# Patient Record
Sex: FEMALE | Race: WHITE | Employment: FULL TIME | URBAN - METROPOLITAN AREA
[De-identification: names, ages, dates, MRNs, and addresses within clinical notes are randomized per-mention and may not be internally consistent; named-entity substitution may affect disease eponyms.]

---

## 2018-03-18 PROCEDURE — 99282 EMERGENCY DEPT VISIT SF MDM: CPT

## 2018-03-19 ENCOUNTER — HOSPITAL ENCOUNTER (EMERGENCY)
Age: 28
Discharge: HOME | End: 2018-03-19
Attending: EMERGENCY MEDICINE
Payer: SELF-PAY

## 2018-03-19 VITALS
TEMPERATURE: 98.2 F | SYSTOLIC BLOOD PRESSURE: 165 MMHG | WEIGHT: 190 LBS | HEART RATE: 91 BPM | BODY MASS INDEX: 31.65 KG/M2 | DIASTOLIC BLOOD PRESSURE: 89 MMHG | OXYGEN SATURATION: 100 % | HEIGHT: 65 IN | RESPIRATION RATE: 20 BRPM

## 2018-03-19 DIAGNOSIS — H66.91 RIGHT ACUTE OTITIS MEDIA: Primary | ICD-10-CM

## 2018-03-19 RX ORDER — IBUPROFEN 800 MG/1
800 TABLET ORAL EVERY 8 HOURS
Qty: 15 TAB | Refills: 0 | Status: SHIPPED | OUTPATIENT
Start: 2018-03-19 | End: 2018-03-24

## 2018-03-19 RX ORDER — AZITHROMYCIN 250 MG/1
TABLET, FILM COATED ORAL
Qty: 6 TAB | Refills: 0 | Status: SHIPPED | OUTPATIENT
Start: 2018-03-19 | End: 2018-03-24

## 2018-03-19 RX ORDER — LIDOCAINE HYDROCHLORIDE 20 MG/ML
15 SOLUTION OROPHARYNGEAL AS NEEDED
Qty: 1 BOTTLE | Refills: 0 | Status: SHIPPED | OUTPATIENT
Start: 2018-03-19 | End: 2018-09-01

## 2018-03-19 NOTE — LETTER
[facility] [place] 
THE [facility] EMERGENCY DEPT 
[address] [address] 
[phone] Work/School Note Date: 3/18/2018 To Whom It May concern: 
 
Dilip Key was seen and treated today in the emergency room by the following provider(s): 
Attending Provider: Gabriel Quesada MD.   
 
Dilip Kye may return to work on 3/21/18.  
 
Sincerely, 
 
 
 
 
 
 
 
Tab Goodwin MD

## 2018-03-19 NOTE — ED TRIAGE NOTES
Patient with complaints of a sore throat since Friday night. Patient's mother told her that it looked like a scratch.

## 2018-03-19 NOTE — ED PROVIDER NOTES
EMERGENCY DEPARTMENT HISTORY AND PHYSICAL EXAM    Date: 3/19/2018  Patient Name: Magdalena Trevizo    History of Presenting Illness     Chief Complaint   Patient presents with    Sore Throat         History Provided By: Patient    Chief Complaint: Sore throat  Duration: 3 days   Timing:  Worsening  Location: Throat  Quality: Aching  Severity: 8 out of 10  Modifying Factors: Notes no relief with Tylenol, last dose 7.5 hours ago. Exacerbated with talking, laughing, and breathing. Associated Symptoms: right ear pain    Additional History (Context):   12:35 AM  Magdalena Trevizo is a 24 y.o. female with PMHx asthma who presents ambulatory to the emergency department C/O gradually worsening sore throat (rated 8/10), onset 3 days ago. Notes no relief with Tylenol, last dose 7.5 hours ago. Exacerbated with talking, laughing, and breathing. Associated sxs include right ear pain. Pt's mother told her it \"looked like a cut. \" Endorses social EtOH use. Pt denies fever, chills, daily medication use, tobacco/illicit drug use, or any other sxs or complaints. PCP: Saroj Ibarra MD        Past History     Past Medical History:  Past Medical History:   Diagnosis Date    Anxiety     Asthma     Heart murmur     Panic attacks        Past Surgical History:  History reviewed. No pertinent surgical history. Family History:  History reviewed. No pertinent family history. Social History:  Social History   Substance Use Topics    Smoking status: Never Smoker    Smokeless tobacco: Never Used    Alcohol use No       Allergies: Allergies   Allergen Reactions    Aspirin Swelling    Pcn [Penicillins] Rash         Review of Systems   Review of Systems   Constitutional: Negative for chills and fever. HENT: Positive for ear pain (right) and sore throat. All other systems reviewed and are negative.       Physical Exam     Vitals:    03/19/18 0031   BP: 165/89   Pulse: 91   Resp: 20   Temp: 98.2 °F (36.8 °C)   SpO2: 100%   Weight: 86.2 kg (190 lb)   Height: 5' 5\" (1.651 m)     Physical Exam   Nursing note and vitals reviewed. Constitutional: Well appearing, no acute distress  Head: Normocephalic, Atraumatic;   Mouth: Erythematous posterior oropharynx;   Ears: Right TM erythematous and cloudy  Eyes: Pupils are equal, round, and reactive to light, EOMI  Neck: Supple  Chest: Normal work of breathing and chest excursion bilaterally  Back: No evidence of trauma or deformity  Extremities: No evidence of trauma or deformity, no LE edema  Skin: Warm and dry, normal cap refill  Neuro: Alert and appropriate, CN intact, normal speech  Psychiatric: Normal mood and affect    Diagnostic Study Results     Labs -   No results found for this or any previous visit (from the past 12 hour(s)). Radiologic Studies -    No orders to display     CT Results  (Last 48 hours)    None        CXR Results  (Last 48 hours)    None            Medical Decision Making   I am the first provider for this patient. I reviewed the vital signs, available nursing notes, past medical history, past surgical history, family history and social history. Vital Signs-Reviewed the patient's vital signs. Pulse Oximetry Analysis - 100% on RA     Records Reviewed: Nursing Notes    Provider Notes (Medical Decision Making):     Procedures:  Procedures    MEDICATIONS GIVEN:  Medications - No data to display    ED Course:   12:35 AM   Initial assessment performed. The patients presenting problems have been discussed, and they are in agreement with the care plan formulated and outlined with them. I have encouraged them to ask questions as they arise throughout their visit. Diagnosis and Disposition     Discussion:  24 y.o. female presents with hx and exam consistent with otitis and pharyngitis. Plan for abx, symptom management, and discharge with PCP follow up and return precautions provided. Pt understands and agrees with this plan.     DISCHARGE NOTE:  12:45 AM  Sukhdeep Martinez results have been reviewed with her. She has been counseled regarding her diagnosis, treatment, and plan. She verbally conveys understanding and agreement of the signs, symptoms, diagnosis, treatment and prognosis and additionally agrees to follow up as discussed. She also agrees with the care-plan and conveys that all of her questions have been answered. I have also provided discharge instructions for her that include: educational information regarding their diagnosis and treatment, and list of reasons why they would want to return to the ED prior to their follow-up appointment, should her condition change. She has been provided with education for proper emergency department utilization. CLINICAL IMPRESSION:    1. Right acute otitis media        PLAN:  1. D/C Home  2. Discharge Medication List as of 3/19/2018 12:52 AM      START taking these medications    Details   lidocaine (LIDOCAINE VISCOUS) 2 % solution Take 15 mL by mouth as needed for Pain., Print, Disp-1 Bottle, R-0      ibuprofen (MOTRIN) 800 mg tablet Take 1 Tab by mouth every eight (8) hours for 5 days. , Print, Disp-15 Tab, R-0      azithromycin (ZITHROMAX Z-AISHA) 250 mg tablet Take 2 Tabs PO day 1 and 1 Tab PO days 2-5, Print, Disp-6 Tab, R-0           3. Follow-up Information     Follow up With Details Comments MD Varinder Schedule an appointment as soon as possible for a visit in 2 days for PCP follow up [address] [place] [address],[address]      [facility] EMERGENCY DEPT  As needed, If symptoms worsen [address] [place] [address]  [phone]        _______________________________    Attestations: This note is prepared by Chase Do, acting as Scribe for Tanya Palmer MD.    Tanya Palmer MD:  The scribe's documentation has been prepared under my direction and personally reviewed by me in its entirety.   I confirm that the note above accurately reflects all work, treatment, procedures, and medical decision making performed by me.  _______________________________

## 2018-03-19 NOTE — DISCHARGE INSTRUCTIONS
Ear Infection (Otitis Media): Care Instructions  Your Care Instructions    An ear infection may start with a cold and affect the middle ear (otitis media). It can hurt a lot. Most ear infections clear up on their own in a couple of days. Most often you will not need antibiotics. This is because many ear infections are caused by a virus. Antibiotics don't work against a virus. Regular doses of pain medicines are the best way to reduce your fever and help you feel better. Follow-up care is a key part of your treatment and safety. Be sure to make and go to all appointments, and call your doctor if you are having problems. It's also a good idea to know your test results and keep a list of the medicines you take. How can you care for yourself at home? · Take pain medicines exactly as directed. ¨ If the doctor gave you a prescription medicine for pain, take it as prescribed. ¨ If you are not taking a prescription pain medicine, take an over-the-counter medicine, such as acetaminophen (Tylenol), ibuprofen (Advil, Motrin), or naproxen (Aleve). Read and follow all instructions on the label. ¨ Do not take two or more pain medicines at the same time unless the doctor told you to. Many pain medicines have acetaminophen, which is Tylenol. Too much acetaminophen (Tylenol) can be harmful. · Plan to take a full dose of pain reliever before bedtime. Getting enough sleep will help you get better. · Try a warm, moist washcloth on the ear. It may help relieve pain. · If your doctor prescribed antibiotics, take them as directed. Do not stop taking them just because you feel better. You need to take the full course of antibiotics. When should you call for help? Call your doctor now or seek immediate medical care if:  ? · You have new or increasing ear pain. ? · You have new or increasing pus or blood draining from your ear. ? · You have a fever with a stiff neck or a severe headache. ? Watch closely for changes in your health, and be sure to contact your doctor if:  ? · You have new or worse symptoms. ? · You are not getting better after taking an antibiotic for 2 days. Where can you learn more? Go to http://sammy-pravin.info/. Enter A184 in the search box to learn more about \"Ear Infection (Otitis Media): Care Instructions. \"  Current as of: May 12, 2017  Content Version: 11.4  © 4747-1906 [employer], [employer]. Care instructions adapted under license by [employer] (which disclaims liability or warranty for this information). If you have questions about a medical condition or this instruction, always ask your healthcare professional. [address] any warranty or liability for your use of this information.

## 2018-09-01 ENCOUNTER — HOSPITAL ENCOUNTER (EMERGENCY)
Age: 28
Discharge: HOME | End: 2018-09-02
Attending: EMERGENCY MEDICINE
Payer: COMMERCIAL

## 2018-09-01 DIAGNOSIS — Z23 NEED FOR TETANUS BOOSTER: ICD-10-CM

## 2018-09-01 DIAGNOSIS — S51.011A ELBOW LACERATION, RIGHT, INITIAL ENCOUNTER: Primary | ICD-10-CM

## 2018-09-01 PROCEDURE — 99283 EMERGENCY DEPT VISIT LOW MDM: CPT

## 2018-09-01 PROCEDURE — 75810000293 HC SIMP/SUPERF WND  RPR

## 2018-09-02 VITALS
WEIGHT: 195 LBS | BODY MASS INDEX: 32.49 KG/M2 | SYSTOLIC BLOOD PRESSURE: 128 MMHG | DIASTOLIC BLOOD PRESSURE: 77 MMHG | TEMPERATURE: 98.3 F | HEART RATE: 92 BPM | OXYGEN SATURATION: 99 % | HEIGHT: 65 IN | RESPIRATION RATE: 16 BRPM

## 2018-09-02 PROCEDURE — 75810000293 HC SIMP/SUPERF WND  RPR

## 2018-09-02 PROCEDURE — 74011250636 HC RX REV CODE- 250/636: Performed by: PHYSICIAN ASSISTANT

## 2018-09-02 PROCEDURE — 74011000250 HC RX REV CODE- 250: Performed by: PHYSICIAN ASSISTANT

## 2018-09-02 PROCEDURE — 90715 TDAP VACCINE 7 YRS/> IM: CPT | Performed by: PHYSICIAN ASSISTANT

## 2018-09-02 PROCEDURE — 77030002986 HC SUT PROL J&J -A

## 2018-09-02 PROCEDURE — 90471 IMMUNIZATION ADMIN: CPT

## 2018-09-02 PROCEDURE — 77030018836 HC SOL IRR NACL ICUM -A

## 2018-09-02 RX ADMIN — TETANUS TOXOID, REDUCED DIPHTHERIA TOXOID AND ACELLULAR PERTUSSIS VACCINE, ADSORBED 0.5 ML: 5; 2.5; 8; 8; 2.5 SUSPENSION INTRAMUSCULAR at 00:39

## 2018-09-02 RX ADMIN — Medication 2 ML: at 00:39

## 2018-09-02 NOTE — ED PROVIDER NOTES
EMERGENCY DEPARTMENT HISTORY AND PHYSICAL EXAM 
 
Date: 9/1/2018 Patient Name: Francis Cao History of Presenting Illness Chief Complaint Patient presents with  Laceration History Provided By: Patient Chief Complaint: wound, laceration Duration: 2.5 Hours Timing:  Acute Location: right arm Severity: 8 out of 10 Associated Symptoms: denies any other associated signs or symptoms Additional History (Context):  
 
12:31 AM 
 
Francis Cao is a 34 y.o. female with pertinent PMHx of anxiety presenting ambulatory to the ED c/o laceration with 8/10 pain to her right arm s/p injury at 2200 today. Pt states that she was in the bathroom at [facility] when she cut herself on the bathroom handle. Pt states that her tetanus is not UTD. Pt denies any history of DM. Pt specifically denies any other wounds, fever/chills, or N/V/D. 
 
PCP: Naomi Osman MD 
Pt does not smoke tobacco, drink ETOH or do illicit drugs. There are no other complaints, changes, or physical findings at this time. Past History Past Medical History: 
Past Medical History:  
Diagnosis Date  Anxiety  Asthma  DJD (degenerative joint disease)  Heart murmur  Panic attacks Past Surgical History: 
History reviewed. No pertinent surgical history. Family History: 
History reviewed. No pertinent family history. Social History: 
Social History Substance Use Topics  Smoking status: Never Smoker  Smokeless tobacco: Never Used  Alcohol use No  
 
 
Allergies: Allergies Allergen Reactions  Aspirin Swelling  Pcn [Penicillins] Rash Review of Systems Review of Systems Constitutional: Negative for chills and fever. Gastrointestinal: Negative for diarrhea, nausea and vomiting. Skin: Positive for wound (right arm). Neurological: Negative for headaches. All other systems reviewed and are negative. Physical Exam  
 
Vitals:  
 09/01/18 2334 09/02/18 0200 BP: 132/72 128/77 Pulse: (!) 103 92 Resp: 18 16 Temp: 98.3 °F (36.8 °C) SpO2: 99% 99% Weight: 88.5 kg (195 lb) Height: 5' 5\" (1.651 m) Physical Exam  
Constitutional: She is oriented to person, place, and time. She appears well-developed and well-nourished. HENT:  
Head: Normocephalic and atraumatic. Cardiovascular: Normal rate, regular rhythm, normal heart sounds and intact distal pulses. No murmur heard. Pulmonary/Chest: Effort normal and breath sounds normal. No respiratory distress. She has no wheezes. She has no rales. Musculoskeletal:  
     Right elbow: Normal.No tenderness found. Neurological: She is alert and oriented to person, place, and time. Skin:  
 
  
Psychiatric: She has a normal mood and affect. Judgment normal.  
Nursing note and vitals reviewed. Diagnostic Study Results Labs - No results found for this or any previous visit (from the past 12 hour(s)). Radiologic Studies - No orders to display Medical Decision Making I am the first provider for this patient. I reviewed the vital signs, available nursing notes, past medical history, past surgical history, family history and social history. Vital Signs-Reviewed the patient's vital signs. Pulse Oximetry Analysis - 99% on RA Records Reviewed: Nursing Notes and Old Medical Records Provider Notes (Medical Decision Making): Laceration in need of repair and tetanus updated. No bony injury, no FB  
 
PROCEDURES: 
Wound Repair 
Date/Time: 9/2/2018 1:41 AM 
Performed by: PAPreparation: skin prepped with Shur-Clens Pre-procedure re-eval: Immediately prior to the procedure, the patient was reevaluated and found suitable for the planned procedure and any planned medications. Location details: right arm Wound length:2.5 cm or less Anesthesia: local infiltration Anesthesia: 
Local Anesthetic: lidocaine 1% without epinephrine Anesthetic total: 3 mL Foreign bodies: no foreign bodies Wound fascia closure material used: 5-0 prolyene. Number of sutures: 2 Technique: simple and interrupted Approximation: close Dressing: 4x4 Patient tolerance: Patient tolerated the procedure well with no immediate complications My total time at bedside, performing this procedure was 16-30 minutes. Comments: LET was also used prior to procedure MEDICATIONS GIVEN IN THE ED: 
Medications diph,Pertuss(AC),Tet Vac-PF (BOOSTRIX) suspension 0.5 mL (0.5 mL IntraMUSCular Given 9/2/18 0039)  
lidocaine/EPINEPHrine/tetracaine (LET) topical solution 2 mL (2 mL Topical Given 9/2/18 0039) ED COURSE:  
12:31 AM  
Initial assessment performed. Diagnosis and Disposition DISCHARGE NOTE: 
2:00 AM 
The patient is ready for discharge. The patient's signs, symptoms, diagnosis, and discharge instructions have been discussed and the patient and/or family has conveyed their understanding. The patient and/or family is to follow up as recommended or return to the ER should their symptoms worsen. Plan has been discussed and the patient and/or family is in agreement. Written by Crow Ochoa, ED Scribe, as dictated by Kelsea Ibrahim PA-C.  
 
CLINICAL IMPRESSION: 
1. Elbow laceration, right, initial encounter 2. Need for tetanus booster PLAN: 
1. D/C Home 2. There are no discharge medications for this patient. 3.  
Follow-up Information Follow up With Details Comments Contact Info Paulette Rubio MD Schedule an appointment as soon as possible for a visit As needed [place] [address] [place] [address] 
[phone] [facility] EMERGENCY DEPT  As needed, If symptoms worsen [address] [place] [address] 
[phone]  
  
 
_______________________________ Attestations: This note is prepared by Nette Evans, acting as Scribe for Kelsea Ibrahim PA-C.  
 
Kelsea Ibrahim PA-C:  The scribe's documentation has been prepared under my direction and personally reviewed by me in its entirety. I confirm that the note above accurately reflects all work, treatment, procedures, and medical decision making performed by me. 
_______________________________

## 2018-09-02 NOTE — ED NOTES
Pt give discharge instructions on wound care and to get the stiches out in 2 weeks. However, pt left before discharge paperwork was given. Pt ambulatory to lobby with friends.

## 2018-10-04 ENCOUNTER — HOSPITAL ENCOUNTER (EMERGENCY)
Age: 28
Discharge: HOME | End: 2018-10-04
Attending: EMERGENCY MEDICINE
Payer: COMMERCIAL

## 2018-10-04 ENCOUNTER — APPOINTMENT (OUTPATIENT)
Dept: GENERAL RADIOLOGY | Age: 28
End: 2018-10-04
Attending: EMERGENCY MEDICINE
Payer: COMMERCIAL

## 2018-10-04 VITALS
OXYGEN SATURATION: 99 % | HEIGHT: 65 IN | DIASTOLIC BLOOD PRESSURE: 72 MMHG | RESPIRATION RATE: 14 BRPM | HEART RATE: 80 BPM | BODY MASS INDEX: 32.49 KG/M2 | TEMPERATURE: 97.8 F | WEIGHT: 195 LBS | SYSTOLIC BLOOD PRESSURE: 150 MMHG

## 2018-10-04 DIAGNOSIS — E11.9 NEW ONSET TYPE 2 DIABETES MELLITUS (HCC): Primary | ICD-10-CM

## 2018-10-04 LAB
ALBUMIN SERPL-MCNC: 3.6 G/DL (ref 3.4–5)
ALBUMIN/GLOB SERPL: 0.9 {RATIO} (ref 0.8–1.7)
ALP SERPL-CCNC: 65 U/L (ref 45–117)
ALT SERPL-CCNC: 51 U/L (ref 13–56)
ANION GAP SERPL CALC-SCNC: 7 MMOL/L (ref 3–18)
APPEARANCE UR: CLEAR
AST SERPL-CCNC: 18 U/L (ref 15–37)
BACTERIA URNS QL MICRO: ABNORMAL /HPF
BASOPHILS # BLD: 0.1 K/UL (ref 0–0.1)
BASOPHILS NFR BLD: 1 % (ref 0–2)
BILIRUB SERPL-MCNC: 0.2 MG/DL (ref 0.2–1)
BILIRUB UR QL: NEGATIVE
BUN SERPL-MCNC: 8 MG/DL (ref 7–18)
BUN/CREAT SERPL: 10 (ref 12–20)
CALCIUM SERPL-MCNC: 9.3 MG/DL (ref 8.5–10.1)
CHLORIDE SERPL-SCNC: 101 MMOL/L (ref 100–108)
CO2 SERPL-SCNC: 27 MMOL/L (ref 21–32)
COLOR UR: YELLOW
CREAT SERPL-MCNC: 0.83 MG/DL (ref 0.6–1.3)
DIFFERENTIAL METHOD BLD: NORMAL
EOSINOPHIL # BLD: 0.4 K/UL (ref 0–0.4)
EOSINOPHIL NFR BLD: 5 % (ref 0–5)
EPITH CASTS URNS QL MICRO: ABNORMAL /LPF (ref 0–5)
ERYTHROCYTE [DISTWIDTH] IN BLOOD BY AUTOMATED COUNT: 13.1 % (ref 11.6–14.5)
GLOBULIN SER CALC-MCNC: 4.1 G/DL (ref 2–4)
GLUCOSE SERPL-MCNC: 220 MG/DL (ref 74–99)
GLUCOSE UR STRIP.AUTO-MCNC: >1000 MG/DL
HCG UR QL: NEGATIVE
HCT VFR BLD AUTO: 38.2 % (ref 35–45)
HGB BLD-MCNC: 12.5 G/DL (ref 12–16)
HGB UR QL STRIP: ABNORMAL
KETONES UR QL STRIP.AUTO: ABNORMAL MG/DL
LEUKOCYTE ESTERASE UR QL STRIP.AUTO: NEGATIVE
LIPASE SERPL-CCNC: 121 U/L (ref 73–393)
LYMPHOCYTES # BLD: 2.9 K/UL (ref 0.9–3.6)
LYMPHOCYTES NFR BLD: 39 % (ref 21–52)
MAGNESIUM SERPL-MCNC: 1.8 MG/DL (ref 1.6–2.6)
MCH RBC QN AUTO: 27.2 PG (ref 24–34)
MCHC RBC AUTO-ENTMCNC: 32.7 G/DL (ref 31–37)
MCV RBC AUTO: 83.2 FL (ref 74–97)
MONOCYTES # BLD: 0.7 K/UL (ref 0.05–1.2)
MONOCYTES NFR BLD: 10 % (ref 3–10)
MUCOUS THREADS URNS QL MICRO: ABNORMAL /LPF
NEUTS SEG # BLD: 3.4 K/UL (ref 1.8–8)
NEUTS SEG NFR BLD: 45 % (ref 40–73)
NITRITE UR QL STRIP.AUTO: NEGATIVE
PH UR STRIP: 5 [PH] (ref 5–8)
PHOSPHATE SERPL-MCNC: 4.4 MG/DL (ref 2.5–4.9)
PLATELET # BLD AUTO: 333 K/UL (ref 135–420)
PMV BLD AUTO: 9.7 FL (ref 9.2–11.8)
POTASSIUM SERPL-SCNC: 4 MMOL/L (ref 3.5–5.5)
PROT SERPL-MCNC: 7.7 G/DL (ref 6.4–8.2)
PROT UR STRIP-MCNC: NEGATIVE MG/DL
RBC # BLD AUTO: 4.59 M/UL (ref 4.2–5.3)
RBC #/AREA URNS HPF: ABNORMAL /HPF (ref 0–5)
SODIUM SERPL-SCNC: 135 MMOL/L (ref 136–145)
SP GR UR REFRACTOMETRY: >1.03 (ref 1–1.03)
UROBILINOGEN UR QL STRIP.AUTO: 1 EU/DL (ref 0.2–1)
WBC # BLD AUTO: 7.5 K/UL (ref 4.6–13.2)
WBC URNS QL MICRO: ABNORMAL /HPF (ref 0–5)

## 2018-10-04 PROCEDURE — 74022 RADEX COMPL AQT ABD SERIES: CPT

## 2018-10-04 PROCEDURE — 80053 COMPREHEN METABOLIC PANEL: CPT | Performed by: EMERGENCY MEDICINE

## 2018-10-04 PROCEDURE — 84100 ASSAY OF PHOSPHORUS: CPT | Performed by: EMERGENCY MEDICINE

## 2018-10-04 PROCEDURE — 81025 URINE PREGNANCY TEST: CPT

## 2018-10-04 PROCEDURE — 85025 COMPLETE CBC W/AUTO DIFF WBC: CPT | Performed by: EMERGENCY MEDICINE

## 2018-10-04 PROCEDURE — 83690 ASSAY OF LIPASE: CPT | Performed by: EMERGENCY MEDICINE

## 2018-10-04 PROCEDURE — 74011250637 HC RX REV CODE- 250/637: Performed by: EMERGENCY MEDICINE

## 2018-10-04 PROCEDURE — 81001 URINALYSIS AUTO W/SCOPE: CPT | Performed by: EMERGENCY MEDICINE

## 2018-10-04 PROCEDURE — 74011250636 HC RX REV CODE- 250/636: Performed by: EMERGENCY MEDICINE

## 2018-10-04 PROCEDURE — 99284 EMERGENCY DEPT VISIT MOD MDM: CPT

## 2018-10-04 PROCEDURE — 83735 ASSAY OF MAGNESIUM: CPT | Performed by: EMERGENCY MEDICINE

## 2018-10-04 PROCEDURE — 96360 HYDRATION IV INFUSION INIT: CPT

## 2018-10-04 RX ORDER — POLYETHYLENE GLYCOL 3350 17 G/17G
17 POWDER, FOR SOLUTION ORAL DAILY
Qty: 510 G | Refills: 1 | Status: ON HOLD | OUTPATIENT
Start: 2018-10-04 | End: 2020-10-20

## 2018-10-04 RX ORDER — SODIUM CHLORIDE 0.9 % (FLUSH) 0.9 %
5-10 SYRINGE (ML) INJECTION EVERY 8 HOURS
Status: DISCONTINUED | OUTPATIENT
Start: 2018-10-04 | End: 2018-10-04 | Stop reason: HOSPADM

## 2018-10-04 RX ORDER — METFORMIN HYDROCHLORIDE 500 MG/1
500 TABLET ORAL
Status: COMPLETED | OUTPATIENT
Start: 2018-10-04 | End: 2018-10-04

## 2018-10-04 RX ORDER — METFORMIN HYDROCHLORIDE 500 MG/1
500 TABLET ORAL 2 TIMES DAILY WITH MEALS
Qty: 30 TAB | Refills: 0 | Status: ON HOLD | OUTPATIENT
Start: 2018-10-04 | End: 2020-10-20

## 2018-10-04 RX ORDER — SODIUM CHLORIDE 0.9 % (FLUSH) 0.9 %
5-10 SYRINGE (ML) INJECTION AS NEEDED
Status: DISCONTINUED | OUTPATIENT
Start: 2018-10-04 | End: 2018-10-04 | Stop reason: HOSPADM

## 2018-10-04 RX ORDER — DICYCLOMINE HYDROCHLORIDE 20 MG/1
20 TABLET ORAL EVERY 6 HOURS
Qty: 20 TAB | Refills: 0 | Status: ON HOLD | OUTPATIENT
Start: 2018-10-04 | End: 2020-10-20

## 2018-10-04 RX ADMIN — METFORMIN HYDROCHLORIDE 500 MG: 500 TABLET, FILM COATED ORAL at 08:13

## 2018-10-04 RX ADMIN — SODIUM CHLORIDE 1000 ML: 900 INJECTION, SOLUTION INTRAVENOUS at 05:53

## 2018-10-04 RX ADMIN — Medication 10 ML: at 05:54

## 2018-10-04 NOTE — ED NOTES
Pt lying on the stretcher smiling. NS bolus continues to infuse. VSS. No needs/or complaints being voiced by pt. NAD observed.

## 2018-10-04 NOTE — LETTER
NOTIFICATION RETURN TO WORK / SCHOOL 
 
10/4/2018 7:41 AM 
 
Ms. Alma Tanner [address] [place] [id number] To Whom It May Concern: 
 
Alma Manzo is currently under the care of [facility] EMERGENCY DEPT. She will return to work/school on: October 5th, 2018 If there are questions or concerns please have the patient contact our office.  
 
 
 
Sincerely, 
 
 
 
 
Tracy Ramírez MD

## 2018-10-04 NOTE — ED PROVIDER NOTES
EMERGENCY DEPARTMENT HISTORY AND PHYSICAL EXAM 
 
Date: 10/4/2018 Patient Name: Lele De Jesus History of Presenting Illness Chief Complaint Patient presents with  Abdominal Pain History Provided By: Patient Chief Complaint: Abd pain Duration: 1 Months Timing:  Intermittent and Worsening Location: LUQ Severity: 4 out of 10 Modifying Factors: eating food worsens the pain Associated Symptoms: nausea and vomiting Additional History (Context):  
5:28 AM 
Lele De Jesus is a 34 y.o. female with PMHx of anxiety, panic attacks, anxiety, DJD and FHx of borderline DM presents to the emergency department C/O worsening intermittent abd pain onset 1 month. Associated sxs include polydipsia, polyuria, nausea, and vomiting. Pt reports over the past month the patient has been experiencing abd more frequently occurring about 30-45 minute episodes. Pt reports that her abd pain worsens after eating. At present the patient is not in any pain. Pt has occasionally irregular MP, never been pregnant. Pt does not endorse any tobacco use, occasional EtOH use. Pt denies diarrhea, constipation, and any other sxs or complaints. PCP: Angela Saez MD 
 
 
 
Past History Past Medical History: 
Past Medical History:  
Diagnosis Date  Anxiety  Asthma  DJD (degenerative joint disease)  Heart murmur  Panic attacks Past Surgical History: 
History reviewed. No pertinent surgical history. Family History: 
History reviewed. No pertinent family history. Social History: 
Social History Substance Use Topics  Smoking status: Never Smoker  Smokeless tobacco: Never Used  Alcohol use No  
 
 
Allergies: Allergies Allergen Reactions  Aspirin Swelling  Pcn [Penicillins] Rash Review of Systems Review of Systems Gastrointestinal: Positive for abdominal pain, nausea and vomiting. Negative for diarrhea. Endocrine: Positive for polydipsia and polyuria. All other systems reviewed and are negative. Physical Exam  
 
Vitals:  
 10/04/18 1882 10/04/18 4925 10/04/18 4472 BP: 140/82  152/74 Pulse: 81  84 Resp: 16  12 Temp: 97.5 °F (36.4 °C)  97 °F (36.1 °C) SpO2: 98% 98% 99% Weight: 88.5 kg (195 lb) Height: 5' 5\" (1.651 m) Physical Exam  
Constitutional: She is oriented to person, place, and time. She appears well-developed and well-nourished. No distress. Obese uncomfortable appearing nontoxic HENT:  
Head: Normocephalic and atraumatic. Right Ear: External ear normal.  
Left Ear: External ear normal.  
Mouth/Throat: Oropharynx is clear and moist. No oropharyngeal exudate. Eyes: Conjunctivae and EOM are normal. Pupils are equal, round, and reactive to light. No scleral icterus. No pallor Neck: Normal range of motion. Neck supple. No JVD present. No tracheal deviation present. No thyromegaly present. Cardiovascular: Normal rate, regular rhythm and normal heart sounds. Pulmonary/Chest: Effort normal and breath sounds normal. No stridor. No respiratory distress. Abdominal: Soft. Bowel sounds are normal. She exhibits no distension. There is no hepatosplenomegaly. There is no tenderness. There is no rebound, no guarding and negative Barnard's sign. Musculoskeletal: Normal range of motion. She exhibits no edema or tenderness. No soft tissue injuries Lymphadenopathy:  
  She has no cervical adenopathy. Neurological: She is alert and oriented to person, place, and time. She has normal reflexes. No cranial nerve deficit. Coordination normal.  
Skin: Skin is warm and dry. No rash noted. She is not diaphoretic. No erythema. Psychiatric: She has a normal mood and affect. Her behavior is normal. Judgment and thought content normal.  
Nursing note and vitals reviewed. Diagnostic Study Results Labs - Recent Results (from the past 12 hour(s)) URINALYSIS W/ RFLX MICROSCOPIC  Collection Time: 10/04/18  5:18 AM  
 Result Value Ref Range Color YELLOW Appearance CLEAR Specific gravity >1.030 (H) 1.005 - 1.030  
 pH (UA) 5.0 5.0 - 8.0 Protein NEGATIVE  NEG mg/dL Glucose >1000 (A) NEG mg/dL Ketone TRACE (A) NEG mg/dL Bilirubin NEGATIVE  NEG Blood SMALL (A) NEG Urobilinogen 1.0 0.2 - 1.0 EU/dL Nitrites NEGATIVE  NEG Leukocyte Esterase NEGATIVE  NEG    
URINE MICROSCOPIC ONLY Collection Time: 10/04/18  5:18 AM  
Result Value Ref Range WBC 1 to 2 0 - 5 /hpf  
 RBC 10 to 12 0 - 5 /hpf Epithelial cells 2 to 4 0 - 5 /lpf Bacteria FEW (A) NEG /hpf Mucus FEW (A) NEG /lpf  
HCG URINE, QL. - POC Collection Time: 10/04/18  5:23 AM  
Result Value Ref Range Pregnancy test,urine (POC) NEGATIVE  NEG    
CBC WITH AUTOMATED DIFF Collection Time: 10/04/18  5:45 AM  
Result Value Ref Range WBC 7.5 4.6 - 13.2 K/uL  
 RBC 4.59 4.20 - 5.30 M/uL  
 HGB 12.5 12.0 - 16.0 g/dL HCT 38.2 35.0 - 45.0 % MCV 83.2 74.0 - 97.0 FL  
 MCH 27.2 24.0 - 34.0 PG  
 MCHC 32.7 31.0 - 37.0 g/dL  
 RDW 13.1 11.6 - 14.5 % PLATELET 950 073 - 804 K/uL MPV 9.7 9.2 - 11.8 FL  
 NEUTROPHILS 45 40 - 73 % LYMPHOCYTES 39 21 - 52 % MONOCYTES 10 3 - 10 % EOSINOPHILS 5 0 - 5 % BASOPHILS 1 0 - 2 %  
 ABS. NEUTROPHILS 3.4 1.8 - 8.0 K/UL  
 ABS. LYMPHOCYTES 2.9 0.9 - 3.6 K/UL  
 ABS. MONOCYTES 0.7 0.05 - 1.2 K/UL  
 ABS. EOSINOPHILS 0.4 0.0 - 0.4 K/UL  
 ABS. BASOPHILS 0.1 0.0 - 0.1 K/UL  
 DF AUTOMATED METABOLIC PANEL, COMPREHENSIVE Collection Time: 10/04/18  5:45 AM  
Result Value Ref Range Sodium 135 (L) 136 - 145 mmol/L Potassium 4.0 3.5 - 5.5 mmol/L Chloride 101 100 - 108 mmol/L  
 CO2 27 21 - 32 mmol/L Anion gap 7 3.0 - 18 mmol/L Glucose 220 (H) 74 - 99 mg/dL BUN 8 7.0 - 18 MG/DL Creatinine 0.83 0.6 - 1.3 MG/DL  
 BUN/Creatinine ratio 10 (L) 12 - 20 GFR est AA >60 >60 ml/min/1.73m2 GFR est non-AA >60 >60 ml/min/1.73m2  Calcium 9.3 8.5 - 10.1 MG/DL  
 Bilirubin, total 0.2 0.2 - 1.0 MG/DL  
 ALT (SGPT) 51 13 - 56 U/L  
 AST (SGOT) 18 15 - 37 U/L Alk. phosphatase 65 45 - 117 U/L Protein, total 7.7 6.4 - 8.2 g/dL Albumin 3.6 3.4 - 5.0 g/dL Globulin 4.1 (H) 2.0 - 4.0 g/dL A-G Ratio 0.9 0.8 - 1.7 LIPASE Collection Time: 10/04/18  5:45 AM  
Result Value Ref Range Lipase 121 73 - 393 U/L MAGNESIUM Collection Time: 10/04/18  5:45 AM  
Result Value Ref Range Magnesium 1.8 1.6 - 2.6 mg/dL PHOSPHORUS Collection Time: 10/04/18  5:45 AM  
Result Value Ref Range Phosphorus 4.4 2.5 - 4.9 MG/DL Radiologic Studies -  
XR ABD ACUTE W 1 V CHEST    (Results Pending) CT Results  (Last 48 hours) None CXR Results  (Last 48 hours) None 6:26 AM 
RADIOLOGY FINDINGS 
ABD X-ray shows no free air, extensive amount of stool with distension in the RUQ Non obstructive bowel gas pattern No signs of blockage Pending review by Radiologist 
Recorded by Agnes Henry ED Scribe, as dictated by Lenord Peabody. Komal Madrigal MD  
 
Medical Decision Making I am the first provider for this patient. I reviewed the vital signs, available nursing notes, past medical history, past surgical history, family history and social history. Vital Signs-Reviewed the patient's vital signs. Pulse Oximetry Analysis - 98% on RA Cardiac Monitor: 
Rate: 81 bpm 
Rhythm: NSR Records Reviewed: Nursing Notes and Old Medical Records Provider Notes (Medical Decision Making): DDx: New onset of DM, PCOS, constipation, colitis, enteritis. Unlikely to be kidney stones, blockages or obstructions. Procedures: 
Procedures ED Course:  
5:28 AM Initial assessment performed. The patients presenting problems have been discussed, and they are in agreement with the care plan formulated and outlined with them. I have encouraged them to ask questions as they arise throughout their visit. Diagnosis and Disposition DISCHARGE NOTE: 
7:00 AM 
 Cindy Beckford  results have been reviewed with her. She has been counseled regarding her diagnosis, treatment, and plan. She verbally conveys understanding and agreement of the signs, symptoms, diagnosis, treatment and prognosis and additionally agrees to follow up as discussed. She also agrees with the care-plan and conveys that all of her questions have been answered. I have also provided discharge instructions for her that include: educational information regarding their diagnosis and treatment, and list of reasons why they would want to return to the ED prior to their follow-up appointment, should her condition change. She has been provided with education for proper emergency department utilization. CLINICAL IMPRESSION: 
 
1. New onset type 2 diabetes mellitus ([address] Discussion: PLAN: 
1. D/C Home 2. Current Discharge Medication List  
  
START taking these medications Details  
dicyclomine (BENTYL) 20 mg tablet Take 1 Tab by mouth every six (6) hours. Qty: 20 Tab, Refills: 0  
  
metFORMIN (GLUCOPHAGE) 500 mg tablet Take 1 Tab by mouth two (2) times daily (with meals). Qty: 30 Tab, Refills: 0  
  
polyethylene glycol (MIRALAX) 17 gram/dose powder Take 17 g by mouth daily. 1 tablespoon with 8 oz of water daily 
Qty: 510 g, Refills: 1 3. Follow-up Information Follow up With Details Comments Contact José Miguel Samayoa DO  For primary care follow up [address] [place] [address] 
[phone] THE [facility] EMERGENCY DEPT  As needed, if symptoms worsen [address] [facility] [id number] [phone]  
  
 
_______________________________ Attestations: This note is prepared by Alecia Still, acting as Scribe for Kamar Thompson. Ade Talavera MD. Kamar Thompson. Ade Talavera MD:  The scribe's documentation has been prepared under my direction and personally reviewed by me in its entirety.   I confirm that the note above accurately reflects all work, treatment, procedures, and medical decision making performed by me. 
_______________________________

## 2018-10-04 NOTE — ED NOTES
This RN entering room as pt calling out to work. Pt reporting to have LUQ abd pressure/pain that comes/goes for the last month. NV x 2 days ago. Burning with urination. Pt has urine cup as encouraged pt to provide an urine specimen. \"I'm thinking watery thoughts. \"

## 2018-10-04 NOTE — ED TRIAGE NOTES
Triage: pt complains of left UQ pain x 1 month. Pt states that the area feels \"lumpy. \" Pt states that pain is intermittent every 45 minutes.

## 2019-08-14 NOTE — DISCHARGE INSTRUCTIONS
8/14/2019      Ebenezer Hernández is a 15 m o  male   No Known Allergies    ASSESSMENT AND PLAN:  OVERALL:   1  Encounter for immunization  - PNEUMOCOCCAL CONJUGATE VACCINE 13-VALENT GREATER THAN 6 MONTHS  - Hepatitis A Vaccine Pediatric/Adolescent 2 dose IM  - Varicella vaccine subcutaneous  - MMR vaccine subcutaneous  - DTAP HIB IPV COMBINED VACCINE IM    2  Encounter for well baby exam with abnormal findings, over 29days old    1  Underweight in childhood with BMI < 5th percentile  - Patient is < 1 percentile in length and weight; unable to assess trends as we do not have prior records  Will request from [facility]   - Patient's half-sister was small and her parents are of modest stature  - 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  - Patient to return on Friday when Dr Yoel Estrella is in the office for further evaluation    GROWTH TREND ASSESSMENT: Unsure if following trends as we do not have growth trend records from [facility]  0-2 yr   Head Circumference %  (0-3 yr)   9 %ile (Z= -1 33) based on WHO (Boys, 0-2 years) head circumference-for-age based on Head Circumference recorded on 8/14/2019  Length for Age %  <1 %ile (Z= -2 64) based on WHO (Boys, 0-2 years) Length-for-age data based on Length recorded on 8/14/2019  Weight for Age %  <1 %ile (Z= -2 95) based on WHO (Boys, 0-2 years) weight-for-age data using vitals from 8/14/2019  Weight for Length % 1 %ile (Z= -2 24) based on WHO (Boys, 0-2 years) weight-for-recumbent length data based on body measurements available as of 8/14/2019  OTHER PROBLEM SPECIFIC DIAGNOSES AND PLANS:    Age appropriate Routine Advice given with additional tailored advice as needed as follows:    DIET  - Patient with adequate diet  Mother with questions about feeding as patient is underweight   Mother to RTO on Friday when Dr Yoel Estrella is present    - Lead and hemoglobin testing will be performed at next visit as patient Noninsulin Medicines for Type 2 Diabetes: Care Instructions  Your Care Instructions    There are different types of noninsulin medicines for diabetes. Each works in a different way. But they all help you control your blood sugar. Some types help your body make insulin to lower your blood sugar. Others lower how much insulin your body needs. Some can slow how fast your body digests sugars. And some can remove extra glucose through your urine. · Alpha-glucosidase inhibitors. These keep starches from breaking down. This means that they lower the amount of glucose absorbed when you eat. They don't help your body make more insulin. So they will not cause low blood sugar unless you use them with other medicines for diabetes. They include acarbose and miglitol. · DPP-4 inhibitors. These help your body raise the level of insulin after you eat. They also help your body make less of a hormone that raises blood sugar. They include linagliptin, saxagliptin, and sitagliptin. · Incretin hormones (GLP-1 receptor agonists). Your body makes a protein that can raise your insulin level. It also can lower your blood sugar and make you less hungry. You can get shots of hormones that work the same way. They include exenatide and liraglutide. · Meglitinides. These help your body release insulin. They also help slow how your body digests sugars. So they can keep your blood sugar from rising too fast after you eat. They include nateglinide and repaglinide. · Metformin. This lowers how much glucose your liver makes. And it helps you respond better to insulin. It also lowers the amount of stored sugar that your liver releases when you are not eating. · SGLT2 inhibitors. These help to remove extra glucose through your urine. They may also help some people lose weight. They include canagliflozin, dapagliflozin, and empagliflozin. · Sulfonylureas. These help your body release more insulin. Some work for many hours.  They can cause low blood sugar if you don't eat as you planned. They include glipizide and glyburide. · Thiazolidinediones. These reduce the amount of blood glucose. They also help you respond better to insulin. They include pioglitazone and rosiglitazone. You may need to take more than one medicine for diabetes. Two or more medicines may work better to lower your blood sugar level than just one does. Follow-up care is a key part of your treatment and safety. Be sure to make and go to all appointments, and call your doctor if you are having problems. It's also a good idea to know your test results and keep a list of the medicines you take. How can you care for yourself at home? · Eat a healthy diet. Get some exercise each day. This may help you to reduce how much medicine you need. · Do not take other prescription or over-the-counter medicines, vitamins, herbal products, or supplements without talking to your doctor first. Some medicines for type 2 diabetes can cause problems with other medicines or supplements. · Tell your doctor if you plan to get pregnant. Some of these drugs are not safe for pregnant women. · Be safe with medicines. Take your medicines exactly as prescribed. Meglitinides and sulfonylureas can cause your blood sugar to drop very low. Call your doctor if you think you are having a problem with your medicine. · Check your blood sugar often. You can use a glucose monitor. Keeping track can help you know how certain foods, activities, and medicines affect your blood sugar. And it can help you keep your blood sugar from getting so low that it's not safe. When should you call for help? Call 911 anytime you think you may need emergency care.  For example, call if:    · You passed out (lost consciousness).     · You are confused or cannot think clearly.     · Your blood sugar is very high or very low.    Watch closely for changes in your health, and be sure to contact your doctor if:    · Your blood sugar stays received 4 shots today  - No risk factors for iron deficiency anemia    DENTAL  advised age appropriate brushing minimum twice daily for 2 minutes with fluoride toothpaste    ELIMINATION: No concerns    SLEEPING Naps for 45 minutes during the day  Sleeps 8-10 hours/night  IMMUNIZATIONS (Z23) potential reactions discussed, VIS sheets given, ordered as following  (delete immunizations which do not apply) or specify other order   12 mon Pentacel, Prevnar, Hep A, MMR, Varicella    VISION AND HEARING  age appropriate screening normal    SAFETY Age appropriate safety advice given regarding  household, vehicle, sport, sun, second hand smoke avoidance and lead avoidance  Age appropriate Lead screening ordered (9-12 months and 3years of age) or reviewed   no lead poisoning risk    FAMILY/ SOCIAL HEALTH no concerns     DEVELOPMENT  Age appropriate [place] Milestones     CC: 5 y/o well visit exam  HPI   Detailed wellness history from patient and guardian includin  DIET/NUTRITION   age appropriate intake   Quality  - Patient average daily diet is as follows   - Breakfast: 1/2 banana, 2 oz baby yogurt with 2-3 oz whole milk in the morning   - Lunch: 1-2 2oz jar of baby food meat and vegetables for lunch on average, 2-3 oz of apple juice or water   - Dinner: 2-3 oz of rice, noodles, vegetables and chicken (table food) and sometimes will eat 2 oz jar of vegetables and 2 oz jar  of meat (baby food), 2-3 oz of apple juice or water   - Snack: 1-2 oz of cheese puffs (baby food)   - Breast feeding 4-5x/day for 20 minutes at a time   - Baby does not like bottles and therefore is not formula feeding presently    2  DENTAL age appropriate except as noted     Teeth brushed minimum 2 min twice daily with fluoride toothpaste     3  ELIMINATION: No urinary or BM concern     4  SLEEPING: Age appropriate > sleeps 8-10 hours per night with 45 min-1 hour nap during the day    5   IMMUNIZATIONS      record reviewed, no history of adverse outside the level your doctor set for you.     · You have any problems. Where can you learn more? Go to http://sammy-pravin.info/. Enter H153 in the search box to learn more about \"Noninsulin Medicines for Type 2 Diabetes: Care Instructions. \"  Current as of: December 7, 2017  Content Version: 11.8  © 5781-4936 [employer]. Care instructions adapted under license by [employer] (which disclaims liability or warranty for this information). If you have questions about a medical condition or this instruction, always ask your healthcare professional. [address] any warranty or liability for your use of this information. reactions  Shots up to date as of today  6  VISION: Age appropriate     7  HEARING:  age appropriate     6  SAFETY:  age appropriate with no concerns       Home/Day care safety including:         Parents smoke out doors  Advised to change clothes prior to holding baby and keep furniture clean of second hand smoke  exposure, child proofing measures in place, home newly renovated with no concern for lead exposure           hot water heater appropriately set, smoke and carbon monoxide detectors in        working order, firearms absent, pet exposure supervised at [facility], no pets in primary household       Vehicle/Sport Safety  age appropriate           appropriate vehicle restraints       Sun Safety  sunblock used appropriately        9  FAMILY SOCIAL/HEALTH (see also Rooming)      Household Composition Mom, Dad, 10 y/o half-sister (Dad is biological parent), no pets      8 Development   Infant Development     appropriate for (gestational) age by [place] Developmental Milestones           OTHER ISSUES:    REVIEW OF SYSTEMS: no significant active or past problems except as noted in above (OTHER ISSUES)    Constitutional, ENT, Eye, Respiratory, Cardiac, Gastrointestinal, Urogenital, Hematological, Lymphatic, Neurological, Behavioral Health, Skin, Musculoskeletal, Endocrine     PHYSICAL EXAM: within normal limits, age and gender appropriate    VITAL SIGNSHeight 27 5" (69 9 cm), weight 7 019 kg (15 lb 7 6 oz), head circumference 44 5 cm (17 5")  reviewed nurse vitals    Constitutional NAD, appears small for age, engaged and playful during exam, interacts well with mom and grandmother  Head: Normal  Ears: Canals clear, TMs good LR and Landmarks  Eyes: Conjunctivae and EOM are normal  Pupils are equal, round, and reactive to light  Red reflex present   Mouth/Throat: Mucous membranes are moist  Oropharynx is clear   Pharynx is normal     Teeth in good repair  Neck: Supple Normal ROM  Respiratory: Normal effort and breath sounds, Lungs clear,  Cardiovascular Normal: rate, rhythm, pulses, S1,S2 no murmurs,  Abdominal: good BS, no distention, non tender, no organomegaly,   Lymphatic: without adenopathy cervical and axillary nodes  Genitourinary: Gender appropriate  Musculoskeletal Normal: Inspection, ROM, Strength  Neurologic: Normal  Skin: Normal no rash

## 2020-04-14 LAB
ANTIBODY SCREEN, EXTERNAL: NEGATIVE
HBSAG, EXTERNAL: NEGATIVE
HIV, EXTERNAL: NORMAL
RUBELLA, EXTERNAL: NORMAL
TYPE, ABO & RH, EXTERNAL: NORMAL

## 2020-08-07 ENCOUNTER — HOSPITAL ENCOUNTER (OUTPATIENT)
Age: 30
Discharge: HOME | End: 2020-08-08
Attending: OBSTETRICS & GYNECOLOGY | Admitting: OBSTETRICS & GYNECOLOGY
Payer: COMMERCIAL

## 2020-08-07 PROCEDURE — 99283 EMERGENCY DEPT VISIT LOW MDM: CPT

## 2020-08-08 VITALS — HEIGHT: 65 IN | BODY MASS INDEX: 37.49 KG/M2 | WEIGHT: 225 LBS

## 2020-08-08 PROBLEM — Z33.1 IUP (INTRAUTERINE PREGNANCY), INCIDENTAL: Status: ACTIVE | Noted: 2020-08-08

## 2020-08-08 PROBLEM — O36.8190 DECREASED FETAL MOVEMENT: Status: ACTIVE | Noted: 2020-08-08

## 2020-08-08 PROBLEM — Z3A.26 26 WEEKS GESTATION OF PREGNANCY: Status: ACTIVE | Noted: 2020-08-08

## 2020-08-08 PROCEDURE — 99283 EMERGENCY DEPT VISIT LOW MDM: CPT

## 2020-08-08 RX ORDER — HUMAN INSULIN 100 [IU]/ML
INJECTION, SOLUTION SUBCUTANEOUS
COMMUNITY
End: 2020-10-25

## 2020-08-08 NOTE — PROGRESS NOTES
1477 patient is a  at 26.3 weeks who presents to unit with complaints of no fetal movement for 3 days. Patient brought to triage 3.    0018 Bedside, Verbal shift change report given to ESTEBAN Andersen (oncoming nurse) by Daniele Geiger Rn (offgoing nurse). Report included the following information SBAR, Kardex, Intake/Output and MAR.

## 2020-08-08 NOTE — ROUTINE PROCESS
0018 Bedside verbal report received. Pt awake and alert. EFM/Jolivue applied.  with moderate variability. Accels noted, no dcels noted. Fetal movement noted and pt reports feeling movement now. No complaints or requests voiced. 0035 ESTEBAN Perez reviewed tracing. Report given. Orders noted to discharge home. 0336 Verbal and written discharge instructions given. Pt and  verbalized understanding. 9289 Discharged ambulatory in good condition with  in attendance.

## 2020-08-08 NOTE — PROGRESS NOTES
HPI:    Subjective: DFM     Gogo Martin,  a 34 y.o.   at 26w3d presents to L&D with c/o Decreased fetal movement: Time of onset this PM. Denies ctx/cramping, VB or LOF    Assessment:  Past Medical History:   Diagnosis Date    Anxiety     Asthma     DJD (degenerative joint disease)     Heart murmur     Panic attacks     Psychiatric problem     manic depression     No past surgical history on file. Allergies   Allergen Reactions    Aspirin Swelling    Pcn [Penicillins] Rash     Prior to Admission medications    Medication Sig Start Date End Date Taking? Authorizing Provider   insulin regular (NovoLIN R Regular U-100 Insuln) 100 unit/mL injection by SubCUTAneous route. 35 units AM 20 units PM   Yes Provider, Historical   dicyclomine (BENTYL) 20 mg tablet Take 1 Tab by mouth every six (6) hours. 10/4/18   Kassie Hanson MD   metFORMIN (GLUCOPHAGE) 500 mg tablet Take 1 Tab by mouth two (2) times daily (with meals). 10/4/18   Kassie Hanson MD   polyethylene glycol [facility]) 17 gram/dose powder Take 17 g by mouth daily. 1 tablespoon with 8 oz of water daily 10/4/18   Kassie Hanson MD        Objective:     Vitals:  Vitals:    20 0002   Weight: 102.1 kg (225 lb)   Height: 5' 5\" (1.651 m)        Physical Exam:  Patient without distress. Abdomen: soft, nontender  Fundus: soft and non tender  Membranes:  Intact  Fetal Heart Rate: Reactive  Baseline: 140 per minute  Variability: moderate  Accelerations: yes  Decelerations: none  Uterine contractions: none  Uterine irritability: no  Cervical exam: deferred      Assessment/Plan: reassuring fetal status     Plan: patient reports feeling FM after EFM placed. DC home with standard & ER labor precautions. Follow-up in office for routine visit.      Signed By:  Nithin Wallace CNM     2020

## 2020-10-09 ENCOUNTER — TRANSCRIBE ORDER (OUTPATIENT)
Dept: SCHEDULING | Age: 30
End: 2020-10-09

## 2020-10-09 DIAGNOSIS — R60.0 LOCALIZED EDEMA: Primary | ICD-10-CM

## 2020-10-13 ENCOUNTER — HOSPITAL ENCOUNTER (OUTPATIENT)
Dept: VASCULAR SURGERY | Age: 30
Discharge: HOME | End: 2020-10-13
Attending: STUDENT IN AN ORGANIZED HEALTH CARE EDUCATION/TRAINING PROGRAM

## 2020-10-13 DIAGNOSIS — R60.0 LOCALIZED EDEMA: ICD-10-CM

## 2020-10-13 PROCEDURE — 93970 EXTREMITY STUDY: CPT

## 2020-10-20 ENCOUNTER — HOSPITAL ENCOUNTER (OUTPATIENT)
Age: 30
Discharge: HOME | End: 2020-10-20
Attending: STUDENT IN AN ORGANIZED HEALTH CARE EDUCATION/TRAINING PROGRAM | Admitting: STUDENT IN AN ORGANIZED HEALTH CARE EDUCATION/TRAINING PROGRAM
Payer: COMMERCIAL

## 2020-10-20 VITALS
RESPIRATION RATE: 16 BRPM | TEMPERATURE: 98 F | HEIGHT: 65 IN | DIASTOLIC BLOOD PRESSURE: 84 MMHG | WEIGHT: 259 LBS | BODY MASS INDEX: 43.15 KG/M2 | HEART RATE: 69 BPM | SYSTOLIC BLOOD PRESSURE: 109 MMHG

## 2020-10-20 PROBLEM — Z3A.36 36 WEEKS GESTATION OF PREGNANCY: Status: ACTIVE | Noted: 2020-10-20

## 2020-10-20 PROBLEM — O24.419 GDM (GESTATIONAL DIABETES MELLITUS): Status: ACTIVE | Noted: 2020-10-20

## 2020-10-20 PROCEDURE — 59025 FETAL NON-STRESS TEST: CPT

## 2020-10-20 NOTE — DISCHARGE INSTRUCTIONS
Keep all scheduled appointments  Drink plenty of fluids and eat multiple small meals throughout the day  Come back to L&D triage if your water breaks, have bright red vaginal bleeding, decreased fetal movement and/or regular contractions every 3-5 minutes increasing in intensity. Early Stage of Labor at Home: Care Instructions  Your Care Instructions     If you came to the hospital while in early labor, your doctor may have asked if you want to labor at home until your contractions are stronger. Many women stay at home during early labor. This is often the longest part of the birthing process. It may last up to 2 to 3 days. Contractions are mild to moderate and shorter (about 30 to 45 seconds). You can usually keep talking during them. Contractions may also be irregular, about 5 to 20 minutes apart. They may even stop for a while. It helps to stay as relaxed as you can during this time. You can spend some or all of your early labor at home or anywhere else you may be comfortable. If you live far from the hospital or birthing center, you may want to think about going somewhere nearby so you can get back to the hospital quickly. For some women, there may be benefits to staying home during early labor, such as avoiding medicines or procedures. As labor progresses, you'll shift from early labor to active labor. During this time, contractions get more intense. They occur more often, about every 2 to 3 minutes. They also last longer, about 50 to 70 seconds. You will feel them even when you change positions and walk or move around. It may be hard to tell if you are in active labor. If you aren't sure, call your doctor or midwife. As your labor progresses, check in with your doctor or midwife about when to come back to the hospital or birthing center. You may have special instructions if your water broke or you tested positive for group B strep. Follow-up care is a key part of your treatment and safety.  Be sure to make and go to all appointments, and call your doctor if you are having problems. It's also a good idea to know your test results and keep a list of the medicines you take. How can you care for yourself at home? · Get support. Having a support person with you from early labor until after childbirth can have a positive effect on childbirth. · Find distractions. During early labor, you can walk, play cards, watch TV, or listen to music to help take your mind off your contractions. · Ask your partner, labor , or  for a massage. Shoulder and low back massage during contractions may ease your pain. Strong massage of the back muscles (counterpressure) during contractions may help relieve the pain of back labor. Tell your labor  exactly where to push and how hard to push. · Use imagery. This means using your imagination to decrease your pain. For instance, to help manage pain, picture your contractions as waves rolling over you. Picture a peaceful place, such as a beach or mountain stream, to help you relax between contractions. · Change positions during labor. Walking, kneeling, or sitting on a big rubber ball (birth ball) are good options. · Use focused breathing techniques. Breathing in a rhythm can distract you from pain. · Take a warm shower or bath. Warm water may ease pain and stress. When should you call for help? Call 911 anytime you think you may need emergency care. For example, call if:    · You passed out (lost consciousness).     · You have a seizure.     · You have severe vaginal bleeding.     · You have severe pain in your belly or pelvis.     · You have had fluid gushing or leaking from your vagina and you know or think the umbilical cord is bulging into your vagina. If this happens, immediately get down on your knees so your rear end (buttocks) is higher than your head. This will decrease the pressure on the cord until help arrives.    Call your doctor now or seek immediate medical care if:    · You have new or worse signs of preeclampsia, such as:  ? Sudden swelling of your face, hands, or feet. ? New vision problems (such as dimness, blurring, or seeing spots). ? A severe headache.     · You have any vaginal bleeding.     · You have belly pain or cramping.     · You have a fever.     · You have had regular contractions (with or without pain) for an hour. This means that you have 8 or more within 1 hour or 4 or more in 20 minutes after you change your position and drink fluids.     · You have a sudden release of fluid from your vagina.     · You have low back pain or pelvic pressure that does not go away.     · You notice that your baby has stopped moving or is moving much less than normal.   Watch closely for changes in your health, and be sure to contact your doctor if you have any problems. Where can you learn more? Go to http://www.gray.com/  Enter [id number] in the search box to learn more about \"Early Stage of Labor at Home: Care Instructions. \"  Current as of: February 11, 2020               Content Version: 12.6  © 2774-8080 [employer]. Care instructions adapted under license by [employer] (which disclaims liability or warranty for this information). If you have questions about a medical condition or this instruction, always ask your healthcare professional. [address] any warranty or liability for your use of this information. Counting Your Baby's Kicks: Care Instructions  Your Care Instructions     Counting your baby's kicks is one way your doctor can tell that your baby is healthy. Most women--especially in a first pregnancy--feel their baby move for the first time between 16 and 22 weeks. The movement may feel like flutters rather than kicks. Your baby may move more at certain times of the day. When you are active, you may notice less kicking than when you are resting.  At your prenatal visits, your doctor will ask whether the baby is active. In your last trimester, your doctor may ask you to count the number of times you feel your baby move. Follow-up care is a key part of your treatment and safety. Be sure to make and go to all appointments, and call your doctor if you are having problems. It's also a good idea to know your test results and keep a list of the medicines you take. How do you count fetal kicks? · A common method of checking your baby's movement is to count the number of kicks or moves you feel in 1 hour. Ten movements (such as kicks, flutters, or rolls) in 1 hour are normal. Some doctors suggest that you count in the morning until you get to 10 movements. Then you can quit for that day and start again the next day. · Pick your baby's most active time of day to count. This may be any time from morning to evening. · If you do not feel 10 movements in an hour, your baby may be sleeping. Wait for the next hour and count again. When should you call for help? Call your doctor now or seek immediate medical care if:    · You noticed that your baby has stopped moving or is moving much less than normal.   Watch closely for changes in your health, and be sure to contact your doctor if you have any problems. Where can you learn more? Go to http://www.gray.com/  Enter [id number] in the search box to learn more about \"Counting Your Baby's Kicks: Care Instructions. \"  Current as of: February 11, 2020               Content Version: 12.6  © 1500-1518 [employer]. Care instructions adapted under license by [employer] (which disclaims liability or warranty for this information). If you have questions about a medical condition or this instruction, always ask your healthcare professional. [place] [address] any warranty or liability for your use of this information.             Week 37 of Your Pregnancy: Care Instructions  Your Care Instructions     You are near the end of your pregnancy--and you're probably pretty uncomfortable. It may be harder to walk around. Lying down probably isn't comfortable either. You may have trouble getting to sleep or staying asleep. Most women deliver their babies between 40 and 41 weeks. This is a good time to think about packing a bag for the hospital with items you'll need. Then you'll be ready when labor starts. Follow-up care is a key part of your treatment and safety. Be sure to make and go to all appointments, and call your doctor if you are having problems. It's also a good idea to know your test results and keep a list of the medicines you take. How can you care for yourself at home? Learn about breastfeeding  · Breastfeeding is best for your baby and good for you. · Breast milk has antibodies to help your baby fight infections. · Mothers who breastfeed often lose weight faster, because making milk burns calories. · Learning the best ways to hold your baby will make breastfeeding easier. · Let your partner bathe and diaper the baby to keep your partner from feeling left out. Snuggle together when you breastfeed. · You may want to learn how to use a breast pump and store your milk. · If you choose to bottle feed, make the feeding feel like breastfeeding so you can bond with your baby. Always hold your baby and the bottle. Do not prop bottles or let your baby fall asleep with a bottle. Learn about crying  · It is common for babies to cry for 1 to 3 hours a day. Some cry more, some cry less. · Babies don't cry to make you upset or because you are a bad parent. · Crying is how your baby communicates. Your baby may be hungry; have gas; need a diaper change; or feel cold, warm, tired, lonely, or tense. Sometimes babies cry for unknown reasons. · If you respond to your baby's needs, he or she will learn to trust you. · Try to stay calm when your baby cries.  Your baby may get more upset if he or she senses that you are upset. Know how to care for your   · Your baby's umbilical cord stump will drop off on its own, usually between 1 and 2 weeks. To care for your baby's umbilical cord area:  ? Clean the area at the bottom of the cord 2 or 3 times a day. ? Pay special attention to the area where the cord attaches to the skin. ? Keep the diaper folded below the cord. ? Use a damp washcloth or cotton ball to sponge bathe your baby until the stump has come off. · Your baby's first dark stool is called meconium. After the meconium is passed, your baby will develop his or her own bowel pattern. ? Some babies, especially  babies, have several bowel movements a day. Others have one or two a day, or one every 2 to 3 days. ?  babies often have loose, yellow stools. Formula-fed babies have more formed stools. ? If your baby's stools look like little pellets, he or she is constipated. After 2 days of constipation, call your baby's doctor. · If your baby will be circumcised, you can care for him at home. ? Gently rinse his penis with warm water after every diaper change. Do not try to remove the film that forms on the penis. This film will go away on its own. Pat dry. ? Put petroleum ointment, such as Vaseline, on the area of the diaper that will touch your baby's penis. This will keep the diaper from sticking to your baby. ? Ask the doctor about giving your baby acetaminophen (Tylenol) for pain. Where can you learn more? Go to http://www.gray.com/  Enter N257 in the search box to learn more about \"Week 37 of Your Pregnancy: Care Instructions. \"  Current as of: 2020               Content Version: 12.6  © 5050-1288 [employer]. Care instructions adapted under license by [employer] (which disclaims liability or warranty for this information).  If you have questions about a medical condition or this instruction, always ask your healthcare professional. [place] [address] any warranty or liability for your use of this information.

## 2020-10-20 NOTE — PROGRESS NOTES
200  36+6 weeks gestation sent from OB's office for NST, possible decelerations in office. 1521 Pt placed on FHM. 1523 Pt turned to left side. 1657 Return call rec'd from Dr. Mildred Flores ok to discharge home. 1713 pt rec'd discharge instructions. Pt verbalized understanding and signed discharge instructions. 1714 Pt ambulated off unit independently.

## 2020-10-23 ENCOUNTER — ANESTHESIA (OUTPATIENT)
Dept: LABOR AND DELIVERY | Age: 30
End: 2020-10-23
Payer: COMMERCIAL

## 2020-10-23 ENCOUNTER — ANESTHESIA EVENT (OUTPATIENT)
Dept: LABOR AND DELIVERY | Age: 30
End: 2020-10-23
Payer: COMMERCIAL

## 2020-10-23 ENCOUNTER — HOSPITAL ENCOUNTER (INPATIENT)
Age: 30
LOS: 2 days | Discharge: HOME | End: 2020-10-25
Attending: OBSTETRICS & GYNECOLOGY | Admitting: STUDENT IN AN ORGANIZED HEALTH CARE EDUCATION/TRAINING PROGRAM
Payer: COMMERCIAL

## 2020-10-23 DIAGNOSIS — O24.414 INSULIN CONTROLLED GESTATIONAL DIABETES MELLITUS (GDM) IN THIRD TRIMESTER: Primary | ICD-10-CM

## 2020-10-23 PROBLEM — Z3A.37 37 WEEKS GESTATION OF PREGNANCY: Status: ACTIVE | Noted: 2020-10-23

## 2020-10-23 PROBLEM — E66.9 OBESITY: Status: ACTIVE | Noted: 2020-10-23

## 2020-10-23 PROBLEM — O42.90 PREMATURE RUPTURE OF MEMBRANES (PROM) AFFECTING FIRST PREGNANCY: Status: ACTIVE | Noted: 2020-10-23

## 2020-10-23 LAB
A1 MICROGLOB PLACENTAL VAG QL: POSITIVE
ABO + RH BLD: NORMAL
AMORPH CRY URNS QL MICRO: ABNORMAL
APPEARANCE UR: ABNORMAL
BACTERIA URNS QL MICRO: ABNORMAL /HPF
BASOPHILS # BLD: 0 K/UL (ref 0–0.1)
BASOPHILS NFR BLD: 0 % (ref 0–2)
BILIRUB UR QL: NEGATIVE
BLOOD GROUP ANTIBODIES SERPL: NORMAL
COLOR UR: ABNORMAL
CONTROL LINE PRESENT?: NORMAL
DIFFERENTIAL METHOD BLD: ABNORMAL
EOSINOPHIL # BLD: 0.2 K/UL (ref 0–0.4)
EOSINOPHIL NFR BLD: 2 % (ref 0–5)
EPITH CASTS URNS QL MICRO: ABNORMAL /LPF
EPITH CASTS URNS QL MICRO: ABNORMAL /LPF (ref 0–5)
ERYTHROCYTE [DISTWIDTH] IN BLOOD BY AUTOMATED COUNT: 13.6 % (ref 11.6–14.5)
EXPIRATION DATE: NORMAL
GLUCOSE BLD STRIP.AUTO-MCNC: 105 MG/DL (ref 70–110)
GLUCOSE BLD STRIP.AUTO-MCNC: 131 MG/DL (ref 70–110)
GLUCOSE BLD STRIP.AUTO-MCNC: 75 MG/DL (ref 70–110)
GLUCOSE BLD STRIP.AUTO-MCNC: 78 MG/DL (ref 70–110)
GLUCOSE BLD STRIP.AUTO-MCNC: 81 MG/DL (ref 70–110)
GLUCOSE BLD STRIP.AUTO-MCNC: 89 MG/DL (ref 70–110)
GLUCOSE BLD STRIP.AUTO-MCNC: 90 MG/DL (ref 70–110)
GLUCOSE BLD STRIP.AUTO-MCNC: 91 MG/DL (ref 70–110)
GLUCOSE BLD STRIP.AUTO-MCNC: 98 MG/DL (ref 70–110)
GLUCOSE UR STRIP.AUTO-MCNC: NEGATIVE MG/DL
GRAN CASTS URNS QL MICRO: ABNORMAL /LPF
HCT VFR BLD AUTO: 36.5 % (ref 35–45)
HGB BLD-MCNC: 11.8 G/DL (ref 12–16)
HGB UR QL STRIP: ABNORMAL
INTERNAL NEGATIVE CONTROL: NORMAL
KETONES UR QL STRIP.AUTO: 40 MG/DL
KIT LOT NO.: NORMAL
LEUKOCYTE ESTERASE UR QL STRIP.AUTO: ABNORMAL
LYMPHOCYTES # BLD: 2.2 K/UL (ref 0.9–3.6)
LYMPHOCYTES NFR BLD: 26 % (ref 21–52)
MCH RBC QN AUTO: 27.1 PG (ref 24–34)
MCHC RBC AUTO-ENTMCNC: 32.3 G/DL (ref 31–37)
MCV RBC AUTO: 83.9 FL (ref 74–97)
MONOCYTES # BLD: 0.8 K/UL (ref 0.05–1.2)
MONOCYTES NFR BLD: 9 % (ref 3–10)
NEUTS SEG # BLD: 5.4 K/UL (ref 1.8–8)
NEUTS SEG NFR BLD: 63 % (ref 40–73)
NITRITE UR QL STRIP.AUTO: NEGATIVE
PH UR STRIP: 5 [PH] (ref 5–8)
PLATELET # BLD AUTO: 349 K/UL (ref 135–420)
PMV BLD AUTO: 10.8 FL (ref 9.2–11.8)
PROT UR STRIP-MCNC: 100 MG/DL
RBC # BLD AUTO: 4.35 M/UL (ref 4.2–5.3)
RBC #/AREA URNS HPF: >100 /HPF (ref 0–5)
SP GR UR REFRACTOMETRY: 1.02 (ref 1–1.03)
SPECIMEN EXP DATE BLD: NORMAL
UROBILINOGEN UR QL STRIP.AUTO: 1 EU/DL (ref 0.2–1)
WBC # BLD AUTO: 8.7 K/UL (ref 4.6–13.2)
WBC URNS QL MICRO: ABNORMAL /HPF (ref 0–5)

## 2020-10-23 PROCEDURE — 88307 TISSUE EXAM BY PATHOLOGIST: CPT

## 2020-10-23 PROCEDURE — 76010000392 HC C SECN EA ADDL 0.5 HR: Performed by: STUDENT IN AN ORGANIZED HEALTH CARE EDUCATION/TRAINING PROGRAM

## 2020-10-23 PROCEDURE — 74011250636 HC RX REV CODE- 250/636: Performed by: NURSE ANESTHETIST, CERTIFIED REGISTERED

## 2020-10-23 PROCEDURE — 74011000258 HC RX REV CODE- 258: Performed by: STUDENT IN AN ORGANIZED HEALTH CARE EDUCATION/TRAINING PROGRAM

## 2020-10-23 PROCEDURE — 74011000250 HC RX REV CODE- 250

## 2020-10-23 PROCEDURE — 59025 FETAL NON-STRESS TEST: CPT

## 2020-10-23 PROCEDURE — 74011000250 HC RX REV CODE- 250: Performed by: ANESTHESIOLOGY

## 2020-10-23 PROCEDURE — 76060000078 HC EPIDURAL ANESTHESIA

## 2020-10-23 PROCEDURE — 86900 BLOOD TYPING SEROLOGIC ABO: CPT

## 2020-10-23 PROCEDURE — 74011250636 HC RX REV CODE- 250/636: Performed by: STUDENT IN AN ORGANIZED HEALTH CARE EDUCATION/TRAINING PROGRAM

## 2020-10-23 PROCEDURE — 88305 TISSUE EXAM BY PATHOLOGIST: CPT

## 2020-10-23 PROCEDURE — 74011000250 HC RX REV CODE- 250: Performed by: NURSE ANESTHETIST, CERTIFIED REGISTERED

## 2020-10-23 PROCEDURE — 81001 URINALYSIS AUTO W/SCOPE: CPT

## 2020-10-23 PROCEDURE — 75410000003 HC RECOV DEL/VAG/CSECN EA 0.5 HR: Performed by: STUDENT IN AN ORGANIZED HEALTH CARE EDUCATION/TRAINING PROGRAM

## 2020-10-23 PROCEDURE — 65270000029 HC RM PRIVATE

## 2020-10-23 PROCEDURE — 76010000391 HC C SECN FIRST 1 HR: Performed by: STUDENT IN AN ORGANIZED HEALTH CARE EDUCATION/TRAINING PROGRAM

## 2020-10-23 PROCEDURE — 76060000033 HC ANESTHESIA 1 TO 1.5 HR: Performed by: STUDENT IN AN ORGANIZED HEALTH CARE EDUCATION/TRAINING PROGRAM

## 2020-10-23 PROCEDURE — 77030019905 HC CATH URETH INTMIT MDII -A

## 2020-10-23 PROCEDURE — 74011250636 HC RX REV CODE- 250/636: Performed by: OBSTETRICS & GYNECOLOGY

## 2020-10-23 PROCEDURE — 77030040830 HC CATH URETH FOL MDII -A

## 2020-10-23 PROCEDURE — 75410000003 HC RECOV DEL/VAG/CSECN EA 0.5 HR

## 2020-10-23 PROCEDURE — 74011250636 HC RX REV CODE- 250/636

## 2020-10-23 PROCEDURE — 82962 GLUCOSE BLOOD TEST: CPT

## 2020-10-23 PROCEDURE — 77030007879 HC KT SPN EPDRL TELE -B: Performed by: ANESTHESIOLOGY

## 2020-10-23 PROCEDURE — 74011250636 HC RX REV CODE- 250/636: Performed by: ANESTHESIOLOGY

## 2020-10-23 PROCEDURE — 75410000002 HC LABOR FEE PER 1 HR

## 2020-10-23 PROCEDURE — 84112 EVAL AMNIOTIC FLUID PROTEIN: CPT | Performed by: OBSTETRICS & GYNECOLOGY

## 2020-10-23 PROCEDURE — 85025 COMPLETE CBC W/AUTO DIFF WBC: CPT

## 2020-10-23 RX ORDER — OXYTOCIN/0.9 % SODIUM CHLORIDE 30/500 ML
0-20 PLASTIC BAG, INJECTION (ML) INTRAVENOUS
Status: DISCONTINUED | OUTPATIENT
Start: 2020-10-23 | End: 2020-10-25 | Stop reason: HOSPADM

## 2020-10-23 RX ORDER — ACETAMINOPHEN 325 MG/1
650 TABLET ORAL
Status: DISCONTINUED | OUTPATIENT
Start: 2020-10-23 | End: 2020-10-25 | Stop reason: HOSPADM

## 2020-10-23 RX ORDER — METHYLERGONOVINE MALEATE 0.2 MG/ML
0.2 INJECTION INTRAVENOUS AS NEEDED
Status: DISCONTINUED | OUTPATIENT
Start: 2020-10-23 | End: 2020-10-23 | Stop reason: HOSPADM

## 2020-10-23 RX ORDER — NALBUPHINE HYDROCHLORIDE 10 MG/ML
10 INJECTION, SOLUTION INTRAMUSCULAR; INTRAVENOUS; SUBCUTANEOUS
Status: DISCONTINUED | OUTPATIENT
Start: 2020-10-23 | End: 2020-10-23 | Stop reason: HOSPADM

## 2020-10-23 RX ORDER — FENTANYL/ROPIVACAINE/NS/PF 2MCG/ML-.1
PLASTIC BAG, INJECTION (ML) EPIDURAL
Status: DISCONTINUED | OUTPATIENT
Start: 2020-10-23 | End: 2020-10-25 | Stop reason: HOSPADM

## 2020-10-23 RX ORDER — MORPHINE SULFATE 1 MG/ML
INJECTION, SOLUTION EPIDURAL; INTRATHECAL; INTRAVENOUS AS NEEDED
Status: DISCONTINUED | OUTPATIENT
Start: 2020-10-23 | End: 2020-10-23 | Stop reason: HOSPADM

## 2020-10-23 RX ORDER — PHENYLEPHRINE HCL IN 0.9% NACL 1 MG/10 ML
80 SYRINGE (ML) INTRAVENOUS AS NEEDED
Status: DISCONTINUED | OUTPATIENT
Start: 2020-10-23 | End: 2020-10-23 | Stop reason: HOSPADM

## 2020-10-23 RX ORDER — FENTANYL CITRATE 50 UG/ML
100 INJECTION, SOLUTION INTRAMUSCULAR; INTRAVENOUS ONCE
Status: ACTIVE | OUTPATIENT
Start: 2020-10-23 | End: 2020-10-23

## 2020-10-23 RX ORDER — LIDOCAINE HYDROCHLORIDE AND EPINEPHRINE 20; 5 MG/ML; UG/ML
INJECTION, SOLUTION EPIDURAL; INFILTRATION; INTRACAUDAL; PERINEURAL AS NEEDED
Status: DISCONTINUED | OUTPATIENT
Start: 2020-10-23 | End: 2020-10-23 | Stop reason: HOSPADM

## 2020-10-23 RX ORDER — OXYTOCIN/RINGER'S LACTATE 30/500 ML
10 PLASTIC BAG, INJECTION (ML) INTRAVENOUS AS NEEDED
Status: DISCONTINUED | OUTPATIENT
Start: 2020-10-23 | End: 2020-10-23 | Stop reason: HOSPADM

## 2020-10-23 RX ORDER — SIMETHICONE 80 MG
80 TABLET,CHEWABLE ORAL
Status: DISCONTINUED | OUTPATIENT
Start: 2020-10-23 | End: 2020-10-25 | Stop reason: HOSPADM

## 2020-10-23 RX ORDER — ONDANSETRON 2 MG/ML
8 INJECTION INTRAMUSCULAR; INTRAVENOUS
Status: DISCONTINUED | OUTPATIENT
Start: 2020-10-23 | End: 2020-10-24

## 2020-10-23 RX ORDER — KETOROLAC TROMETHAMINE 30 MG/ML
30 INJECTION, SOLUTION INTRAMUSCULAR; INTRAVENOUS EVERY 6 HOURS
Status: DISCONTINUED | OUTPATIENT
Start: 2020-10-24 | End: 2020-10-25 | Stop reason: HOSPADM

## 2020-10-23 RX ORDER — BUTORPHANOL TARTRATE 2 MG/ML
2 INJECTION INTRAMUSCULAR; INTRAVENOUS
Status: DISCONTINUED | OUTPATIENT
Start: 2020-10-23 | End: 2020-10-23 | Stop reason: HOSPADM

## 2020-10-23 RX ORDER — IBUPROFEN 400 MG/1
800 TABLET ORAL
Status: DISCONTINUED | OUTPATIENT
Start: 2020-10-26 | End: 2020-10-25 | Stop reason: HOSPADM

## 2020-10-23 RX ORDER — ZOLPIDEM TARTRATE 5 MG/1
5 TABLET ORAL
Status: DISCONTINUED | OUTPATIENT
Start: 2020-10-23 | End: 2020-10-25 | Stop reason: HOSPADM

## 2020-10-23 RX ORDER — FENTANYL CITRATE 50 UG/ML
INJECTION, SOLUTION INTRAMUSCULAR; INTRAVENOUS
Status: COMPLETED
Start: 2020-10-23 | End: 2020-10-23

## 2020-10-23 RX ORDER — SODIUM CHLORIDE 0.9 % (FLUSH) 0.9 %
5-40 SYRINGE (ML) INJECTION AS NEEDED
Status: DISCONTINUED | OUTPATIENT
Start: 2020-10-23 | End: 2020-10-25 | Stop reason: HOSPADM

## 2020-10-23 RX ORDER — OXYTOCIN/0.9 % SODIUM CHLORIDE 30/500 ML
95 PLASTIC BAG, INJECTION (ML) INTRAVENOUS AS NEEDED
Status: DISCONTINUED | OUTPATIENT
Start: 2020-10-23 | End: 2020-10-25 | Stop reason: HOSPADM

## 2020-10-23 RX ORDER — TERBUTALINE SULFATE 1 MG/ML
0.25 INJECTION SUBCUTANEOUS
Status: DISCONTINUED | OUTPATIENT
Start: 2020-10-23 | End: 2020-10-23 | Stop reason: HOSPADM

## 2020-10-23 RX ORDER — CEFAZOLIN SODIUM/WATER 2 G/20 ML
SYRINGE (ML) INTRAVENOUS
Status: DISCONTINUED
Start: 2020-10-23 | End: 2020-10-23 | Stop reason: WASHOUT

## 2020-10-23 RX ORDER — OXYTOCIN/0.9 % SODIUM CHLORIDE 30/500 ML
PLASTIC BAG, INJECTION (ML) INTRAVENOUS
Status: COMPLETED
Start: 2020-10-23 | End: 2020-10-23

## 2020-10-23 RX ORDER — FENTANYL/ROPIVACAINE/NS/PF 2MCG/ML-.1
12 PLASTIC BAG, INJECTION (ML) EPIDURAL
Status: DISCONTINUED | OUTPATIENT
Start: 2020-10-23 | End: 2020-10-23

## 2020-10-23 RX ORDER — CLINDAMYCIN PHOSPHATE 900 MG/50ML
900 INJECTION, SOLUTION INTRAVENOUS ONCE
Status: COMPLETED | OUTPATIENT
Start: 2020-10-23 | End: 2020-10-23

## 2020-10-23 RX ORDER — SODIUM CHLORIDE 0.9 % (FLUSH) 0.9 %
5-40 SYRINGE (ML) INJECTION AS NEEDED
Status: DISCONTINUED | OUTPATIENT
Start: 2020-10-23 | End: 2020-10-23 | Stop reason: HOSPADM

## 2020-10-23 RX ORDER — FENTANYL CITRATE 50 UG/ML
INJECTION, SOLUTION INTRAMUSCULAR; INTRAVENOUS AS NEEDED
Status: DISCONTINUED | OUTPATIENT
Start: 2020-10-23 | End: 2020-10-23 | Stop reason: HOSPADM

## 2020-10-23 RX ORDER — MISOPROSTOL 100 UG/1
TABLET ORAL
Status: DISCONTINUED
Start: 2020-10-23 | End: 2020-10-23 | Stop reason: WASHOUT

## 2020-10-23 RX ORDER — DIPHENHYDRAMINE HYDROCHLORIDE 50 MG/ML
12.5 INJECTION, SOLUTION INTRAMUSCULAR; INTRAVENOUS
Status: DISCONTINUED | OUTPATIENT
Start: 2020-10-23 | End: 2020-10-23 | Stop reason: HOSPADM

## 2020-10-23 RX ORDER — SODIUM CHLORIDE 0.9 % (FLUSH) 0.9 %
5-40 SYRINGE (ML) INJECTION EVERY 8 HOURS
Status: DISCONTINUED | OUTPATIENT
Start: 2020-10-23 | End: 2020-10-23 | Stop reason: HOSPADM

## 2020-10-23 RX ORDER — SODIUM CHLORIDE 0.9 % (FLUSH) 0.9 %
5-40 SYRINGE (ML) INJECTION EVERY 8 HOURS
Status: DISCONTINUED | OUTPATIENT
Start: 2020-10-24 | End: 2020-10-25 | Stop reason: HOSPADM

## 2020-10-23 RX ORDER — OXYTOCIN/RINGER'S LACTATE 30/500 ML
10 PLASTIC BAG, INJECTION (ML) INTRAVENOUS AS NEEDED
Status: DISCONTINUED | OUTPATIENT
Start: 2020-10-23 | End: 2020-10-25 | Stop reason: HOSPADM

## 2020-10-23 RX ORDER — SODIUM CHLORIDE, SODIUM LACTATE, POTASSIUM CHLORIDE, CALCIUM CHLORIDE 600; 310; 30; 20 MG/100ML; MG/100ML; MG/100ML; MG/100ML
125 INJECTION, SOLUTION INTRAVENOUS CONTINUOUS
Status: DISPENSED | OUTPATIENT
Start: 2020-10-24 | End: 2020-10-24

## 2020-10-23 RX ORDER — OXYTOCIN/0.9 % SODIUM CHLORIDE 30/500 ML
95 PLASTIC BAG, INJECTION (ML) INTRAVENOUS AS NEEDED
Status: COMPLETED | OUTPATIENT
Start: 2020-10-23 | End: 2020-10-23

## 2020-10-23 RX ORDER — ONDANSETRON 2 MG/ML
INJECTION INTRAMUSCULAR; INTRAVENOUS AS NEEDED
Status: DISCONTINUED | OUTPATIENT
Start: 2020-10-23 | End: 2020-10-23 | Stop reason: HOSPADM

## 2020-10-23 RX ORDER — LIDOCAINE HYDROCHLORIDE 10 MG/ML
20 INJECTION, SOLUTION EPIDURAL; INFILTRATION; INTRACAUDAL; PERINEURAL AS NEEDED
Status: DISCONTINUED | OUTPATIENT
Start: 2020-10-23 | End: 2020-10-23 | Stop reason: HOSPADM

## 2020-10-23 RX ORDER — HYDROMORPHONE HYDROCHLORIDE 1 MG/ML
1 INJECTION, SOLUTION INTRAMUSCULAR; INTRAVENOUS; SUBCUTANEOUS
Status: DISCONTINUED | OUTPATIENT
Start: 2020-10-23 | End: 2020-10-23 | Stop reason: HOSPADM

## 2020-10-23 RX ORDER — PROMETHAZINE HYDROCHLORIDE 25 MG/ML
25 INJECTION, SOLUTION INTRAMUSCULAR; INTRAVENOUS
Status: DISCONTINUED | OUTPATIENT
Start: 2020-10-23 | End: 2020-10-25 | Stop reason: HOSPADM

## 2020-10-23 RX ORDER — SODIUM CHLORIDE, SODIUM LACTATE, POTASSIUM CHLORIDE, CALCIUM CHLORIDE 600; 310; 30; 20 MG/100ML; MG/100ML; MG/100ML; MG/100ML
125 INJECTION, SOLUTION INTRAVENOUS CONTINUOUS
Status: DISCONTINUED | OUTPATIENT
Start: 2020-10-23 | End: 2020-10-23 | Stop reason: HOSPADM

## 2020-10-23 RX ORDER — LIDOCAINE HYDROCHLORIDE AND EPINEPHRINE 15; 5 MG/ML; UG/ML
INJECTION, SOLUTION EPIDURAL AS NEEDED
Status: DISCONTINUED | OUTPATIENT
Start: 2020-10-23 | End: 2020-10-23 | Stop reason: HOSPADM

## 2020-10-23 RX ORDER — FACIAL-BODY WIPES
10 EACH TOPICAL
Status: DISCONTINUED | OUTPATIENT
Start: 2020-10-23 | End: 2020-10-25 | Stop reason: HOSPADM

## 2020-10-23 RX ORDER — NALOXONE HYDROCHLORIDE 0.4 MG/ML
0.2 INJECTION, SOLUTION INTRAMUSCULAR; INTRAVENOUS; SUBCUTANEOUS AS NEEDED
Status: DISCONTINUED | OUTPATIENT
Start: 2020-10-23 | End: 2020-10-23 | Stop reason: HOSPADM

## 2020-10-23 RX ORDER — FENTANYL/ROPIVACAINE/NS/PF 2MCG/ML-.1
PLASTIC BAG, INJECTION (ML) EPIDURAL
Status: COMPLETED
Start: 2020-10-23 | End: 2020-10-23

## 2020-10-23 RX ORDER — OXYCODONE AND ACETAMINOPHEN 5; 325 MG/1; MG/1
1-2 TABLET ORAL
Status: DISCONTINUED | OUTPATIENT
Start: 2020-10-23 | End: 2020-10-25 | Stop reason: HOSPADM

## 2020-10-23 RX ORDER — MINERAL OIL
30 OIL (ML) ORAL AS NEEDED
Status: DISCONTINUED | OUTPATIENT
Start: 2020-10-23 | End: 2020-10-23 | Stop reason: HOSPADM

## 2020-10-23 RX ORDER — ROPIVACAINE HYDROCHLORIDE 2 MG/ML
INJECTION, SOLUTION EPIDURAL; INFILTRATION; PERINEURAL AS NEEDED
Status: DISCONTINUED | OUTPATIENT
Start: 2020-10-23 | End: 2020-10-23 | Stop reason: HOSPADM

## 2020-10-23 RX ORDER — KETOROLAC TROMETHAMINE 15 MG/ML
INJECTION, SOLUTION INTRAMUSCULAR; INTRAVENOUS AS NEEDED
Status: DISCONTINUED | OUTPATIENT
Start: 2020-10-23 | End: 2020-10-23 | Stop reason: HOSPADM

## 2020-10-23 RX ADMIN — SODIUM CHLORIDE, SODIUM LACTATE, POTASSIUM CHLORIDE, AND CALCIUM CHLORIDE 125 ML/HR: 600; 310; 30; 20 INJECTION, SOLUTION INTRAVENOUS at 07:25

## 2020-10-23 RX ADMIN — LIDOCAINE HYDROCHLORIDE,EPINEPHRINE BITARTRATE 3 ML: 20; .005 INJECTION, SOLUTION EPIDURAL; INFILTRATION; INTRACAUDAL; PERINEURAL at 20:29

## 2020-10-23 RX ADMIN — Medication 6 MILLI-UNITS/MIN: at 07:30

## 2020-10-23 RX ADMIN — Medication: at 17:44

## 2020-10-23 RX ADMIN — FENTANYL CITRATE 100 MCG: 50 INJECTION, SOLUTION INTRAMUSCULAR; INTRAVENOUS at 08:20

## 2020-10-23 RX ADMIN — Medication 12 ML/HR: at 08:30

## 2020-10-23 RX ADMIN — ROPIVACAINE HYDROCHLORIDE 5 ML: 2 INJECTION, SOLUTION EPIDURAL; INFILTRATION; PERINEURAL at 08:23

## 2020-10-23 RX ADMIN — LIDOCAINE HYDROCHLORIDE,EPINEPHRINE BITARTRATE 10 ML: 20; .005 INJECTION, SOLUTION EPIDURAL; INFILTRATION; INTRACAUDAL; PERINEURAL at 20:17

## 2020-10-23 RX ADMIN — ROPIVACAINE HYDROCHLORIDE 5 ML: 2 INJECTION, SOLUTION EPIDURAL; INFILTRATION; PERINEURAL at 10:18

## 2020-10-23 RX ADMIN — MORPHINE SULFATE 4 MG: 1 INJECTION, SOLUTION EPIDURAL; INTRATHECAL; INTRAVENOUS at 21:34

## 2020-10-23 RX ADMIN — LIDOCAINE HYDROCHLORIDE,EPINEPHRINE BITARTRATE 3 ML: 15; .005 INJECTION, SOLUTION EPIDURAL; INFILTRATION; INTRACAUDAL; PERINEURAL at 08:18

## 2020-10-23 RX ADMIN — CLINDAMYCIN PHOSPHATE 900 MG: 900 INJECTION, SOLUTION INTRAVENOUS at 20:54

## 2020-10-23 RX ADMIN — LIDOCAINE HYDROCHLORIDE,EPINEPHRINE BITARTRATE 2 ML: 20; .005 INJECTION, SOLUTION EPIDURAL; INFILTRATION; INTRACAUDAL; PERINEURAL at 20:22

## 2020-10-23 RX ADMIN — SODIUM CHLORIDE, SODIUM LACTATE, POTASSIUM CHLORIDE, AND CALCIUM CHLORIDE 125 ML/HR: 600; 310; 30; 20 INJECTION, SOLUTION INTRAVENOUS at 09:09

## 2020-10-23 RX ADMIN — Medication 14 ML/HR: at 11:55

## 2020-10-23 RX ADMIN — KETOROLAC TROMETHAMINE 30 MG: 15 INJECTION, SOLUTION INTRAMUSCULAR; INTRAVENOUS at 21:30

## 2020-10-23 RX ADMIN — SODIUM CHLORIDE, SODIUM LACTATE, POTASSIUM CHLORIDE, AND CALCIUM CHLORIDE: 600; 310; 30; 20 INJECTION, SOLUTION INTRAVENOUS at 21:53

## 2020-10-23 RX ADMIN — SODIUM CHLORIDE, SODIUM LACTATE, POTASSIUM CHLORIDE, AND CALCIUM CHLORIDE 125 ML/HR: 600; 310; 30; 20 INJECTION, SOLUTION INTRAVENOUS at 17:28

## 2020-10-23 RX ADMIN — LIDOCAINE HYDROCHLORIDE,EPINEPHRINE BITARTRATE 5 ML: 20; .005 INJECTION, SOLUTION EPIDURAL; INFILTRATION; INTRACAUDAL; PERINEURAL at 20:54

## 2020-10-23 RX ADMIN — ONDANSETRON HYDROCHLORIDE 4 MG: 2 INJECTION INTRAMUSCULAR; INTRAVENOUS at 21:18

## 2020-10-23 RX ADMIN — GENTAMICIN SULFATE 285 MG: 40 INJECTION, SOLUTION INTRAMUSCULAR; INTRAVENOUS at 20:43

## 2020-10-23 RX ADMIN — SODIUM CHLORIDE, SODIUM LACTATE, POTASSIUM CHLORIDE, AND CALCIUM CHLORIDE 999 ML/HR: 600; 310; 30; 20 INJECTION, SOLUTION INTRAVENOUS at 20:00

## 2020-10-23 RX ADMIN — ROPIVACAINE HYDROCHLORIDE 5 ML: 2 INJECTION, SOLUTION EPIDURAL; INFILTRATION; PERINEURAL at 08:26

## 2020-10-23 RX ADMIN — Medication 500 ML/HR: at 21:10

## 2020-10-23 NOTE — ANESTHESIA PROCEDURE NOTES
Epidural Block    Start time: 10/23/2020 7:52 AM  End time: 10/23/2020 8:26 AM  Performed by: Jarvis Stephens MD  Authorized by: Jarvis Stephens MD     Pre-Procedure  Indication: labor epidural    Preanesthetic Checklist: patient identified, risks and benefits discussed, anesthesia consent, site marked, patient being monitored, timeout performed and anesthesia consent    Timeout Time: 08:07        Epidural:   Patient position:  Seated  Prep region:  Lumbar  Prep: Chlorhexidine    Location:  L4-5    Needle and Epidural Catheter:   Needle Type:  Tuohy  Needle Gauge:  17 G  Injection Technique:  Loss of resistance using saline  Attempts:  1  Catheter Size:  19 G  Catheter at Skin Depth (cm):  14  Depth in Epidural Space (cm):  5  Events: no blood with aspiration, no cerebrospinal fluid with aspiration, no paresthesia and negative aspiration test    Test Dose:  Negative    Assessment:   Catheter Secured:  Tegaderm and tape  Insertion:  Uncomplicated  Patient tolerance:  Patient tolerated the procedure well with no immediate complications  No pain or paresthesia noted on placement, threading or bolusing. No immediate immediate complications noted. Pt stated that she thought she may have tasted a metallic taste in her mouth (I had stated to tell me is she does) several minutes after the test dose. Catheter once again aspirated with no blood return. Test dose was negative for HR x 2. Will continue to monitor closely.

## 2020-10-23 NOTE — PROGRESS NOTES
6443 Dr Caroline Cintron at patient bedside for assessment and placement of epidural.    0807 Start of procedure  0820 Fentanyl vial given to MD  2229,1634 Test Dose  0821 End of procedure    0830 Bedside, verbal, and written SBAR report given to ESTEBAN Triplett RN. Care of patient relinquished at this time.

## 2020-10-23 NOTE — H&P
Ostetrical History and Physical    Subjective:     Date of Admission: 10/23/2020    Patient is a 31 y.o.   female admitted with SPROM before labor at 39 wks. Contractions, now picking up. ON 40UNITS INSULIN AM AND 20 PM,. For Obstetric history, see bakariantal.    For Review of Systems, see prenatal    Past Medical History:   Diagnosis Date    Anxiety     Asthma     DJD (degenerative joint disease)     Gestational diabetes     Heart abnormality     heart murmur    Heart murmur     Infertility, female     Panic attacks     Psychiatric problem     manic depression      No past surgical history on file. Prior to Admission medications    Medication Sig Start Date End Date Taking? Authorizing Provider   prenatal /VDBB fum/folic/dha (PRENATAL-1 PO) Take 1 Tab by mouth daily. Yes Provider, Historical   insulin regular (NovoLIN R Regular U-100 Insuln) 100 unit/mL injection by SubCUTAneous route. 40 units AM 20 units PM   Yes Provider, Historical     Allergies   Allergen Reactions    Aspirin Swelling    Pcn [Penicillins] Rash      Social History     Tobacco Use    Smoking status: Never Smoker    Smokeless tobacco: Never Used   Substance Use Topics    Alcohol use: No      No family history on file. Objective:     Blood pressure 135/78, pulse 90, temperature 98.5 °F (36.9 °C), resp. rate 16, height 5' 5\" (1.651 m), weight 117 kg (258 lb). Temp (24hrs), Av.5 °F (36.9 °C), Min:98.5 °F (36.9 °C), Max:98.5 °F (36.9 °C)        No intake/output data recorded. No intake/output data recorded. HEENT: No pallor, no jaundice, Thyroid and throat normal  RESPIRATORY: Clear to A & P  CVS: pulse reg, HS normal  ABDOMEN: Gravid. Vertex. EFW=6.75lb +-1lb. No abnormal tenderness. Pelvic: Cervix 1,   Effaced: 50%  Station:  -2  Data Review:   No results found for this or any previous visit (from the past 24 hour(s)).   Monitor:  Reactivity:present Variability:present Baseline:within normal limits    Assessment:     Principal Problem:    Premature rupture of membranes (PROM) affecting first pregnancy (10/23/2020)    Active Problems:    GDM (gestational diabetes mellitus) (10/20/2020)      37 weeks gestation of pregnancy (10/23/2020)      Obesity (10/23/2020)        Plan:       Check labs:  CBC, glucose FS  Check  Prenatal:    Start pitocin    Type of admit:In-Patient    I saw and examined patient.     Signed By: Lety Henley MD                         October 23, 2020

## 2020-10-23 NOTE — PROGRESS NOTES
Pt is comfortable and states no further pain intervention needed. Pain rating is within pt's acceptable.

## 2020-10-23 NOTE — PROGRESS NOTES
Problem: Patient Education: Go to Patient Education Activity  Goal: Patient/Family Education  Outcome: Progressing Towards Goal     Problem: Vaginal Delivery: Day of Deliver-Laboring  Goal: Activity/Safety  Outcome: Progressing Towards Goal  Goal: Consults, if ordered  Outcome: Progressing Towards Goal  Goal: Diagnostic Test/Procedures  Outcome: Progressing Towards Goal  Goal: Nutrition/Diet  Outcome: Progressing Towards Goal  Goal: Discharge Planning  Outcome: Progressing Towards Goal  Goal: Medications  Outcome: Progressing Towards Goal  Goal: Respiratory  Outcome: Progressing Towards Goal  Goal: Treatments/Interventions/Procedures  Outcome: Progressing Towards Goal  Goal: *Vital signs within defined limits  Outcome: Progressing Towards Goal  Goal: *Labs within defined limits  Outcome: Progressing Towards Goal  Goal: *Hemodynamically stable  Outcome: Progressing Towards Goal  Goal: *Optimal pain control at patient's stated goal  Outcome: Progressing Towards Goal     Problem: Pain  Goal: *Control of Pain  Outcome: Progressing Towards Goal     Problem: Patient Education: Go to Patient Education Activity  Goal: Patient/Family Education  Outcome: Progressing Towards Goal     Problem: Falls - Risk of  Goal: *Absence of Falls  Description: Document Buddy Fall Risk and appropriate interventions in the flowsheet.   Outcome: Progressing Towards Goal  Note: Fall Risk Interventions:            Medication Interventions: Patient to call before getting OOB, Teach patient to arise slowly                   Problem: Patient Education: Go to Patient Education Activity  Goal: Patient/Family Education  Outcome: Progressing Towards Goal

## 2020-10-23 NOTE — PROGRESS NOTES
1116 dr Celia Pretty at bedside speak ing to patient about replacement.     1350 Oscar Marroquin at bedside to place a second epidural    1140 Epidural removed, tip intact

## 2020-10-23 NOTE — PROGRESS NOTES
Labor Progress Note  Patient seen, fetal heart rate and contraction pattern evaluated, patient examined. No data found.     Physical Exam:  Cervical Exam: AL 90 0  Membranes:  SROM prior to admission  Uterine Activity: every 2-3 mintues  Fetal Heart Rate: cat 2 with moderate variability    Assessment/Plan:  Reassuring fetal status, Labor  Progressing normally, Continue plan for vaginal delivery  Andre Reyna MD  OBGYN  10/23/2020  4:47 PM

## 2020-10-23 NOTE — ANESTHESIA PREPROCEDURE EVALUATION
Relevant Problems   No relevant active problems       Anesthetic History        Pertinent negatives: No PONV       Review of Systems / Medical History  Patient summary reviewed, nursing notes reviewed and pertinent labs reviewed    Pulmonary            Asthma   Pertinent negatives: No smoker     Neuro/Psych         Psychiatric history (anxiety)  Pertinent negatives: No seizures and CVA   Cardiovascular      Valvular problems/murmurs (pt states that she has been told that she has a \"pinhole in her heart\" since childhood. No limitations to activities. Specifically denied any h/o aortic stenosis.)                 GI/Hepatic/Renal             Pertinent negatives: No liver disease and renal disease   Endo/Other    Diabetes (gestational)    Morbid obesity and arthritis (DDD lumbar spine. Specifically denies h/o spinal stenosis.)     Other Findings            Physical Exam    Airway  Mallampati: II  TM Distance: 4 - 6 cm  Neck ROM: normal range of motion   Mouth opening: Normal     Cardiovascular    Rhythm: regular  Rate: normal      Pertinent negatives: No murmur   Dental  No notable dental hx       Pulmonary  Breath sounds clear to auscultation               Abdominal         Other Findings            Anesthetic Plan    ASA: 3  Anesthesia type: epidural            Anesthetic plan and risks discussed with: Patient      Plan labor epidural.  All risks discussed including but not limited to pain, bleeding, infection, block failure, headache, hypotension, and increased risk of nerve damage due to lumbar spine degenerative disease. Patient understands and accepts all risks and agrees with plan to proceed with epidural analgesia.

## 2020-10-23 NOTE — ROUTINE PROCESS
0830:  Bedside and Verbal shift change report given to Alexandrea Maynard RN 
 (oncoming nurse) by Maico Conte RN (offgoing nurse). Report included the following information SBAR, Intake/Output, MAR and Recent Results. Alarm parameters reviewed and opportunities for questions provided. Dr. Chuy Regalado at bedside assessing patient and discussing plan of care. 0840:  Patient assisted with positioning on bedpan, voided 300ml of clear yellow urine, jaja care, bed pad changed, hand hygiene, patient repositioned left lateral, EFM and TOCO adjusted. SVE 1.5/80/-1 
 
0925:  Patient complaints of IV alarm beeping with any movement of her wrist with current IV position. New IV placed in right hand (see Flowsheet) per patient request. 
 
1013:  Dr. Naomy Griggs returned page and this RN notified him that patient continues to report pain after using epidural PCA button, repositioning, voiding. Dr. Naomy Griggs will come to bedside to administer epidural bolus. Patient notified of plan of care and verbalizes agreement. 1015:  Dr. Lizabeth Simmonds at bedside administering epidural bolus at this time. 1028:  Patient positioned on bedpan as requested to void, patient states she feels she needs to void however, she is unable, running water, light pelvic pressure, patient given water and instructed to blow bubbles. Patient voided 120ml. 1035:  Jaja care, bed pad changed. Patient repositioned left lateral, EFM and TOCO adjusted. 1040:  Patient reports continued hip and vaginal cramping despite epidural bolus, use of PCA epidural button and increase in epidural basal rate, SVE due to continued reports of increasing patient and patient request - 2/90/-1. Repositioned left lateral, EFM and TOCO adjusted. 1144: This RN resumed care of patient from Evelin Pal RN.  Patient sitting up in bed positioned for epidural. Dr. Naomy Griggs at bedside replacing epidural. Pitocin paused during epidural placement due to no monitoring with positioning for epidural 
 
1144:  Dr. Blessing Dove @ bedside discussing epidural.  
Consent form signed. 1147: Timeout performed. 1149: Catheter placed. 1150: Test dose administered. 1153 and 1155: Loading dose administered. Pt lying back in bed. PCA pump started. 1350: Attempt to void on bedpan, interventions used including warm water running and poured over perineum, light bladder palpation, blowing bubbles, patient states she cannot feel to void. Straight cath, 150ml. SVE 5/90/-1, jaja care, bed pad changed, gown changed, hand hygiene. Patient repositioned high Fowlers. 1430:  SBAR report given to  including patient now comfortable after second epidural placed, SVE, intermittent tracing and late decelerations noted with decrease in pitocin level, possible need for internal monitoring due to difficulty assessing fetal heart rate and contractions in order to continue to increase pitocin rate. Dr. Stacy Glaser states he will reassess need for internal monitoring around 1630 and to continue with pitocin at this time. 1450:  Jaja care, bed pad changed. Patient repositioned right lateral with peanut ball between knees, EFM and TOCO adjusted. 1505: Bedside and Verbal shift change report given to Kelly Curtis RN (oncoming nurse) by Efrain Miner RN 
 (offgoing nurse). Report included the following information SBAR, Intake/Output, MAR and Recent Results. Alarm parameters reviewed and opportunities for questions provided.

## 2020-10-23 NOTE — PROGRESS NOTES
0545 Pt is a 31 y.o.  at 37w2d, arrived to L&D triage with c/o of SROM at 0335 with clear fluid and small amount of bloody show. States positive fetal movement. EFM and TOCO applied, call bell within reach. 0603 SVE done. Moderate amount clear amniotic fluid noted.

## 2020-10-23 NOTE — PROGRESS NOTES
Problem: Patient Education: Go to Patient Education Activity  Goal: Patient/Family Education  Outcome: Progressing Towards Goal     Problem: Vaginal Delivery: Day of Deliver-Laboring  Goal: Activity/Safety  Outcome: Progressing Towards Goal  Goal: Consults, if ordered  Outcome: Progressing Towards Goal  Goal: Diagnostic Test/Procedures  Outcome: Progressing Towards Goal  Goal: Nutrition/Diet  Outcome: Progressing Towards Goal  Goal: Discharge Planning  Outcome: Progressing Towards Goal  Goal: Medications  Outcome: Progressing Towards Goal  Goal: Respiratory  Outcome: Progressing Towards Goal  Goal: Treatments/Interventions/Procedures  Outcome: Progressing Towards Goal  Goal: *Vital signs within defined limits  Outcome: Progressing Towards Goal  Goal: *Labs within defined limits  Outcome: Progressing Towards Goal  Goal: *Hemodynamically stable  Outcome: Progressing Towards Goal  Goal: *Optimal pain control at patient's stated goal  Outcome: Progressing Towards Goal     Problem: Pain  Goal: *Control of Pain  Outcome: Progressing Towards Goal     Problem: Patient Education: Go to Patient Education Activity  Goal: Patient/Family Education  Outcome: Progressing Towards Goal     Problem: Falls - Risk of  Goal: *Absence of Falls  Description: Document Buddy Fall Risk and appropriate interventions in the flowsheet.   Outcome: Progressing Towards Goal  Note: Fall Risk Interventions:            Medication Interventions: Patient to call before getting OOB, Teach patient to arise slowly                   Problem: Patient Education: Go to Patient Education Activity  Goal: Patient/Family Education  10/23/2020 1424 by Susy Cueto RN  Outcome: Progressing Towards Goal  10/23/2020 1414 by Susy Cueto RN  Outcome: Progressing Towards Goal

## 2020-10-24 LAB
GLUCOSE BLD STRIP.AUTO-MCNC: 109 MG/DL (ref 70–110)
GLUCOSE BLD STRIP.AUTO-MCNC: 113 MG/DL (ref 70–110)
GLUCOSE BLD STRIP.AUTO-MCNC: 115 MG/DL (ref 70–110)
GLUCOSE BLD STRIP.AUTO-MCNC: 125 MG/DL (ref 70–110)
GLUCOSE BLD STRIP.AUTO-MCNC: 207 MG/DL (ref 70–110)
GLUCOSE BLD STRIP.AUTO-MCNC: 70 MG/DL (ref 70–110)
GLUCOSE BLD STRIP.AUTO-MCNC: 73 MG/DL (ref 70–110)
GLUCOSE BLD STRIP.AUTO-MCNC: 98 MG/DL (ref 70–110)
HCT VFR BLD AUTO: 32.4 % (ref 35–45)
HGB BLD-MCNC: 10.5 G/DL (ref 12–16)

## 2020-10-24 PROCEDURE — 74011250636 HC RX REV CODE- 250/636: Performed by: STUDENT IN AN ORGANIZED HEALTH CARE EDUCATION/TRAINING PROGRAM

## 2020-10-24 PROCEDURE — 74011636637 HC RX REV CODE- 636/637: Performed by: STUDENT IN AN ORGANIZED HEALTH CARE EDUCATION/TRAINING PROGRAM

## 2020-10-24 PROCEDURE — 36415 COLL VENOUS BLD VENIPUNCTURE: CPT

## 2020-10-24 PROCEDURE — 85018 HEMOGLOBIN: CPT

## 2020-10-24 PROCEDURE — 82962 GLUCOSE BLOOD TEST: CPT

## 2020-10-24 PROCEDURE — 65270000029 HC RM PRIVATE

## 2020-10-24 PROCEDURE — 85014 HEMATOCRIT: CPT

## 2020-10-24 RX ORDER — ONDANSETRON 2 MG/ML
4 INJECTION INTRAMUSCULAR; INTRAVENOUS
Status: DISCONTINUED | OUTPATIENT
Start: 2020-10-24 | End: 2020-10-25 | Stop reason: HOSPADM

## 2020-10-24 RX ADMIN — HUMAN INSULIN 10 UNITS: 100 INJECTION, SUSPENSION SUBCUTANEOUS at 22:37

## 2020-10-24 RX ADMIN — HUMAN INSULIN 20 UNITS: 100 INJECTION, SUSPENSION SUBCUTANEOUS at 08:22

## 2020-10-24 RX ADMIN — ONDANSETRON 4 MG: 2 INJECTION INTRAMUSCULAR; INTRAVENOUS at 00:32

## 2020-10-24 RX ADMIN — KETOROLAC TROMETHAMINE 30 MG: 30 INJECTION, SOLUTION INTRAMUSCULAR at 16:11

## 2020-10-24 RX ADMIN — SODIUM CHLORIDE, SODIUM LACTATE, POTASSIUM CHLORIDE, AND CALCIUM CHLORIDE 125 ML/HR: 600; 310; 30; 20 INJECTION, SOLUTION INTRAVENOUS at 08:14

## 2020-10-24 RX ADMIN — KETOROLAC TROMETHAMINE 30 MG: 30 INJECTION, SOLUTION INTRAMUSCULAR at 21:23

## 2020-10-24 RX ADMIN — KETOROLAC TROMETHAMINE 30 MG: 30 INJECTION, SOLUTION INTRAMUSCULAR at 09:36

## 2020-10-24 RX ADMIN — ONDANSETRON 4 MG: 2 INJECTION INTRAMUSCULAR; INTRAVENOUS at 08:14

## 2020-10-24 RX ADMIN — KETOROLAC TROMETHAMINE 30 MG: 30 INJECTION, SOLUTION INTRAMUSCULAR at 02:50

## 2020-10-24 NOTE — PROGRESS NOTES
Bedside and Verbal shift change report given to PAULO Castillo,Rn (oncoming nurse) by SANDRA Higuera,FARIBA (offgoing nurse). Report included the following information SBAR, Kardex, Procedure Summary, Intake/Output, MAR and Recent Results. 2340 Pt states pain 7/10. Spoke with CRNA who states pt is very anxious. Pt already had Toradol. Pt is very tired. Encourage pt to get sleep before administering any further pain medication. 0010 When Pt falls asleep O2Sat drops to low 80's. Dr. Thomas Valero on unit. Orders received to start supplemental O2 (2L) by nasal cannula. Monitor pt on L&D until am.      0310 Jaja-care and clean pads provided. 0710 Bedside and Verbal shift change report given to JUNIOR Peralta (oncoming nurse) by Caroline Rivas (offgoing nurse). Report included the following information SBAR, Kardex, Procedure Summary, Intake/Output, MAR and Recent Results.

## 2020-10-24 NOTE — PROGRESS NOTES
Report given to [facility] RN using SBAR. Care of pt relinquished at this time.

## 2020-10-24 NOTE — PROGRESS NOTES
Bedside and Verbal shift change report given to KATELYNN Evans RN (oncoming nurse) by Ashwin Allred. Isauro Sanchez RN (offgoing nurse). Report included the following information SBAR, Kardex, OR Summary, Procedure Summary, Intake/Output, MAR and Recent Results.  Blood glucose obtained 1 hour after meal: 113     Shift assessment complete. Patient denies headache, visual disturbances, and right epigastic pain at this time. Breath sounds clear bilaterally. Patient is passing gas, bowel sounds active, with last BM being 10/23/20. Fundus firm at U with scant lochia, no clots noted on assessment. Incision dressing clean, dry, and intact. Incentive spirometer used at this time and patient educated to continue using 10 times every hour. IV site intact. Trace edema in upper extremities, +2 pitting in lower extremities. Patient free of clonus in lower extremities and denying any pain/tenderness behind knees or in calves. Patient rating pain 7/10 and wanting pain medication at this time. Patient educated to notify nursing staff of: SOB, chest pain/heaviness, blurred vision, lightheadedness, increase in bleeding, and passing of clots, patient verbalized understanding. Q4 vitals assessed, mother provided teach back on how to apply nasal canula when going to sleep     Assisted patient to the restroom for second void. Voided 300 mL. Jaja-care provided      IV flushed. Scheduled Toradol given. IV flushed and capped. Patient to feed     1 Pharmacy preparing insulin       Blood glucose 109, scheduled insulin given at this time. Patient eating tigist crackers      Rounding complete, mother resting quietly. Chest rise and fall noted upon visual assessment, nasal canula flowing at 2 L    0013 Rounding complete, mother resting quietly. No needs expressed at this time, call bel within reach, nasal canula flowing at 2 L    0058 Patient rating pain 8/10. 1 tablet Percocet given.  Mother to feed      [phone] Shift reassessment completed as charted. Patient rating pain 6/10 but declining pain medication at this time until scheduled Toradol. Call bell within reach     [phone] Vitals assessed. Mother resting with eyes closed, nasal canula flowing at 2 L    0414 Assisted mother with latching      0600 Rounding complete, mother up to restroom. No needs expressed at this time    0647 Warm blanket brought to patient. No other needs expressed at this time, call bell within reach     0720 Bedside and Verbal shift change report given to ZACK Villegas RN (oncoming nurse) by Rick Evans RN (offgoing nurse). Report included the following information SBAR, Kardex, OR Summary, Procedure Summary, Intake/Output, MAR and Recent Results.

## 2020-10-24 NOTE — PROGRESS NOTES
Nutrition Brief:    INITIAL NUTRITION ASSESSMENT     REASON FOR ASSESSMENT:   [x]  RN Referral:    [] MST score >/=2  Malnutrition Screening Tool (MST):  Recently Lost Weight Without Trying: No     Eating Poorly Due to Decreased Appetite: No  MST Score: 0    [] Enteral/Parenteral Nutrition PTA   [x] Pregnant (Not in Labor):  [x] Gestational DM     [] Multigestation   [] Pressure Ulcer/Wound Care needs    NUTRITION ASSESSMENT:   Client History: 31 yrs old Female s/p c section. Nutrition best practice alert triggered in error. Please consult if dietitian services are indicated and desired.  Thank you.    [] Participated in Interdisciplinary Rounds  [x] [facility] Reviewed/Documented  [] Discharge Nutrition Plan     Titi Kline RD  PAGER:  [phone]

## 2020-10-24 NOTE — ROUTINE PROCESS
0710 Bedside and Verbal shift change report given to PAULO Castillo RN (oncoming nurse) by Elvira Mcfadden RN (offgoing nurse). Report included the following information SBAR, Kardex, Intake/Output, MAR and Recent Results. 0755 Fasting   
 
0840 Dr. Cynthia Faith at bedside. Orders for patient to have blood sugars checked fasting and before and after each meal. Call if sugars are >200 at any time. 0845 700 mL emptied from gómez. Jaja care provided. Pads changed 0915 TRANSFER - OUT REPORT: 
 
Verbal report given to ZACK Porter RN (name) on Jesse Bautista  being transferred to postpartum (unit) for routine progression of care Report consisted of patients Situation, Background, Assessment and  
Recommendations(SBAR). Information from the following report(s) SBAR, Kardex, Intake/Output, MAR and Recent Results was reviewed with the receiving nurse. Lines:  
Peripheral IV 10/23/20 Left Wrist (Active) Site Assessment Clean, dry, & intact 10/23/20 0808 Phlebitis Assessment 0 10/23/20 0925 Infiltration Assessment 0 10/23/20 0925 Dressing Status Clean, dry, & intact 10/23/20 9921 Dressing Type Tape;Transparent 10/23/20 6481 Hub Color/Line Status Capped;Flushed 10/23/20 0925 Alcohol Cap Used Yes 10/23/20 5542 Peripheral IV 10/23/20 Right Hand (Active) Site Assessment Clean, dry, & intact 10/23/20 1531 Phlebitis Assessment 0 10/23/20 1531 Infiltration Assessment 0 10/23/20 1531 Dressing Status Clean, dry, & intact 10/23/20 1531 Dressing Type Transparent;Tape 10/23/20 1531 Hub Color/Line Status Pink; Infusing 10/23/20 1531 Opportunity for questions and clarification was provided. Patient transported with: 
 Registered Nurse

## 2020-10-24 NOTE — PROGRESS NOTES
0915 TRANSFER - IN REPORT:    Verbal report received from JUNIOR Gusman RN (name) on [place]  being received from L&D(unit) for routine progression of care      Report consisted of patients Situation, Background, Assessment and   Recommendations(SBAR). Information from the following report(s) SBAR, Kardex, OR Summary, Procedure Summary, Intake/Output and Recent Results was reviewed with the receiving nurse. Opportunity for questions and clarification was provided. Assessment completed  And vital signs obtained upon patients arrival to unit and care assumed. 1144 BS obtained resulting in 115, pt to eat an dthen a 1 hour post prandial will be obtained    0936 toradol given    1030 Assisted patient to breastfeed ,  would latch but would not suck. Hand expression education completed with mom and handout with spoon given for reinforcement. Showed how to feed infant expressed colostrum with spoon. Mom verbalized understanding and no questions at this time. 1045 Set mom up with double electric breast pump and educated on how to use initiation mode, pump hygiene, and safe milk storage due to infant not latching/feeding well. Mom to pump q 3 hours for 15 minutes on initiation mode. Mom verbalized understanding and no questions at this time. 1128 BS obtained resulting in 207    1200 B. Francesca NÚÑEZ notified by ZACK Gilliland RN of postprandial BS, no orders received. Thomas Thompson RN notified this nurse. 1300 pt hand expressed 0.5 mL. [place] BS still low, Nohemi Paiz NP instructed pt to start supplementing. Formula provided to mom and educated on infant's stomach size, WNL volume intake in , how to safely prepare formula, bottle hygiene, appropriate way to feed infant with bottle, and burping techniques. Handout given for reinforcement. Mom verbalized understanding and no questions at this time. 1330 Pt gómez removed and ambulated o bathroom, did not void but pad changed.     1345 pt pumping    1519 BS obtained of 70    1605 Reassessment complete and vital signs obtained. Toradol given. 1642 postprandial BS obtained resulting in 98. Instructed pt to pump at 1700, pt verbalized understanding. 1735 pt up to bathroom, voided 500, instructed to pump, pt verbalized understanding. 1930 Bedside and Verbal shift change report given to KATELYNN Evans RN  (oncoming nurse) by Jp Florence. Rosenda Shahid RN (offgoing nurse). Report included the following information SBAR, Kardex, OR Summary, Procedure Summary, Intake/Output, MAR and Recent Results.

## 2020-10-24 NOTE — PROGRESS NOTES
Problem: Patient Education: Go to Patient Education Activity  Goal: Patient/Family Education  Outcome: Progressing Towards Goal     Problem:  Delivery: Postpartum Day 1  Goal: Activity/Safety  Outcome: Progressing Towards Goal  Goal: Consults, if ordered  Outcome: Progressing Towards Goal  Goal: Diagnostic Test/Procedures  Outcome: Progressing Towards Goal  Goal: Nutrition/Diet  Outcome: Progressing Towards Goal  Goal: Discharge Planning  Outcome: Progressing Towards Goal  Goal: Medications  Outcome: Progressing Towards Goal  Goal: Respiratory  Outcome: Progressing Towards Goal  Goal: Treatments/Interventions/Procedures  Outcome: Progressing Towards Goal  Goal: Psychosocial  Outcome: Progressing Towards Goal  Goal: *Vital signs within defined limits  Outcome: Progressing Towards Goal  Goal: *Labs within defined limits  Outcome: Progressing Towards Goal  Goal: *Hemodynamically stable  Outcome: Progressing Towards Goal  Goal: *Optimal pain control at patient's stated goal  Outcome: Progressing Towards Goal  Goal: *Participates in infant care  Outcome: Progressing Towards Goal  Goal: *Demonstrates progressive activity  Outcome: Progressing Towards Goal  Goal: *Tolerating diet  Outcome: Progressing Towards Goal  Goal: *Performs self perineal care  Outcome: Progressing Towards Goal

## 2020-10-24 NOTE — LACTATION NOTE
This note was copied from a baby's chart.  mom still in OR at this time. Will return.   skin to skin at this time. Blood sugar checked - 27. Glucose gel administered. Infant latched and nursed for 20 minutes. Blood sugar rechecked - 50. Mom educated on breastfeeding basics--hunger cues, feeding on demand, waking baby if baby sleeps too long between feeds, importance of skin to skin, positioning and latching, risk of pacifier use and supplemental feedings, and importance of rooming in--and use of log sheet. Mom also educated on benefits of breastfeeding for herself and baby. Mom verbalized understanding. No questions at this time. Swaddled in 3 blankets with hat on head and placed in bassinet.

## 2020-10-24 NOTE — PROGRESS NOTES
Labor Progress Note  Patient seen, fetal heart rate and contraction pattern evaluated, patient examined. No data found. Physical Exam:  Cervical Exam: complete -1 station and remains that after pushing for 1 hour. Maternal effort deminishing however significant caput and molding raises suspicion for CPD  Membranes:  SROM  Uterine Activity: every 3-4 minutes  Fetal Heart Rate: cat 2 with moderate variability    Assessment/Plan:  Proceed with  Section findings consistent with failure of descent. Recommended proceeding with  delivery. Risks of bleeding, infection, bladder and bowel damage explained to patient. They understand the situation and consent to the  delivery. Fetal tachycardia noted. No documented fever yet.  Low threshold for adding vancomycin (PCN allergy)    Eri Plascencia MD  OBGYN  10/23/2020  8:06 PM

## 2020-10-24 NOTE — PROGRESS NOTES
18 B Francesca NÚÑEZ notified at this time of post meal BS. No orders received at this time. Xochilt Tian RN notified.

## 2020-10-24 NOTE — PROGRESS NOTES
Post-Operative  Day 1    Tess Cole     Assessment:   Hospital Problems  Date Reviewed: 10/23/2020          Codes Class Noted POA    37 weeks gestation of pregnancy ICD-10-CM: Z3A.37  ICD-9-CM: V22.2  10/23/2020 Yes        * (Principal) Premature rupture of membranes (PROM) affecting first pregnancy ICD-10-CM: O42.90  ICD-9-CM: 658.10  10/23/2020 Yes        Obesity ICD-10-CM: E66.9  ICD-9-CM: 278.00  10/23/2020 Yes        Labor and delivery, indication for care ICD-10-CM: O75.9  ICD-9-CM: 659.90  10/23/2020 Unknown        GDM (gestational diabetes mellitus) ICD-10-CM: O24.419  ICD-9-CM: 648.80  10/20/2020 Yes            Post-Op day 1, stable    Plan:   1. Routine post-operative care   2. Likely BDM. Insulin regimen halved. Will check pre and post meal BG   3. Desaturation overnight noted only during sleeping episodes, Reviewed case with hospitalist. Only outpatient workup recommended. No acute s/sx suggesting urgent w/u will continue to follow VS closely. Information for the patient's :  Bryn Ortiz [[id number]]   , Low Transverse    Patient doing well without significant complaint. Nausea and vomiting resolved, tolerating liquids, no flatus, gómez in place.     Current Facility-Administered Medications   Medication Dose Route Frequency    oxytocin (PITOCIN) 30 units/500 ml NS  0-20 august-units/min IntraVENous TITRATE    fentaNYL 2 mcg/ml with ropivacaine 0.1 % (PF) epidural   Epidural TITRATE    sodium chloride (NS) flush 5-40 mL  5-40 mL IntraVENous Q8H    lactated Ringers infusion  125 mL/hr IntraVENous CONTINUOUS    ketorolac (TORADOL) injection 30 mg  30 mg IntraVENous Q6H    insulin NPH (NOVOLIN N, HUMULIN N) injection 10 Units  10 Units SubCUTAneous ONCE        Vitals:  Visit Vitals  /62   Pulse 79   Temp 98.9 °F (37.2 °C)   Resp 16   Ht 5' 5\" (1.651 m)   Wt 117.9 kg (260 lb)   SpO2 100%   Breastfeeding Unknown   BMI 43.27 kg/m²     Temp (24hrs), Av.7 °F (37.1 °C), Min:97.6 °F (36.4 °C), Max:100.3 °F (37.9 °C)      Last 24hr Input/Output:    Intake/Output Summary (Last 24 hours) at 10/24/2020 0845  Last data filed at 10/23/2020 2156  Gross per 24 hour   Intake 800 ml   Output 1295 ml   Net -495 ml          Exam:        Patient without distress. Lungs clear. Abdomen, bowel sounds present, soft, expected tenderness, fundus firm Wound dressing intact     Perineum normal lochia noted               Lower extremities are negative for swelling, cords or tenderness.     Labs:   Lab Results   Component Value Date/Time    WBC 8.7 10/23/2020 06:50 AM    WBC 7.5 10/04/2018 05:45 AM    WBC 9.8 08/18/2014 10:40 PM    HGB 10.5 (L) 10/24/2020 02:40 AM    HGB 11.8 (L) 10/23/2020 06:50 AM    HGB 12.5 10/04/2018 05:45 AM    HCT 32.4 (L) 10/24/2020 02:40 AM    HCT 36.5 10/23/2020 06:50 AM    HCT 38.2 10/04/2018 05:45 AM    PLATELET 631 04/29/5834 06:50 AM    PLATELET 595 16/40/9774 05:45 AM    PLATELET 048 88/56/1482 10:40 PM       Recent Results (from the past 24 hour(s))   GLUCOSE, POC    Collection Time: 10/23/20  9:18 AM   Result Value Ref Range    Glucose (POC) 91 70 - 110 mg/dL   GLUCOSE, POC    Collection Time: 10/23/20 10:10 AM   Result Value Ref Range    Glucose (POC) 81 70 - 110 mg/dL   GLUCOSE, POC    Collection Time: 10/23/20 12:02 PM   Result Value Ref Range    Glucose (POC) 90 70 - 110 mg/dL   GLUCOSE, POC    Collection Time: 10/23/20  1:40 PM   Result Value Ref Range    Glucose (POC) 75 70 - 110 mg/dL   GLUCOSE, POC    Collection Time: 10/23/20  2:53 PM   Result Value Ref Range    Glucose (POC) 89 70 - 110 mg/dL   GLUCOSE, POC    Collection Time: 10/23/20  4:03 PM   Result Value Ref Range    Glucose (POC) 78 70 - 110 mg/dL   GLUCOSE, POC    Collection Time: 10/23/20  8:25 PM   Result Value Ref Range    Glucose (POC) 105 70 - 110 mg/dL   URINALYSIS W/ RFLX MICROSCOPIC    Collection Time: 10/23/20 10:00 PM   Result Value Ref Range    Color DARK YELLOW      Appearance TURBID      Specific gravity 1.023 1.005 - 1.030      pH (UA) 5.0 5.0 - 8.0      Protein 100 (A) NEG mg/dL    Glucose Negative NEG mg/dL    Ketone 40 (A) NEG mg/dL    Bilirubin Negative NEG      Blood LARGE (A) NEG      Urobilinogen 1.0 0.2 - 1.0 EU/dL    Nitrites Negative NEG      Leukocyte Esterase TRACE (A) NEG     URINE MICROSCOPIC ONLY    Collection Time: 10/23/20 10:00 PM   Result Value Ref Range    WBC 0 to 3 0 - 5 /hpf    RBC >100 (H) 0 - 5 /hpf    Epithelial cells 3+ 0 - 5 /lpf    Bacteria FEW (A) NEG /hpf    Amorphous Crystals 3+ (A) NEG    Granular cast 4 to 10 NEG /lpf    Epithelial Cast 0 to 3 NEG /lpf   GLUCOSE, POC    Collection Time: 10/23/20 11:19 PM   Result Value Ref Range    Glucose (POC) 131 (H) 70 - 110 mg/dL   HEMATOCRIT    Collection Time: 10/24/20  2:40 AM   Result Value Ref Range    HCT 32.4 (L) 35.0 - 45.0 %   HEMOGLOBIN    Collection Time: 10/24/20  2:40 AM   Result Value Ref Range    HGB 10.5 (L) 12.0 - 16.0 g/dL   GLUCOSE, POC    Collection Time: 10/24/20  7:55 AM   Result Value Ref Range    Glucose (POC) 125 (H) 70 - 110 mg/dL       Tommy Schmidt MD  OBN  10/24/2020  8:45 AM

## 2020-10-24 NOTE — L&D DELIVERY NOTE
Section Delivery Procedure Note    Name: Halina Perry   Medical Record Number: [id number]   YOB: 1990  Today's Date: 2020  Surgeon:   Valerie Mansfield MD; Anderson Us MD  Assistant: * No surgical staff found *     Preoperative Diagnosis: Primary c/s for failure to progress in the second stage    Postoperative Diagnosis: Primary c/s for failure to progress in the second stage    Procedure: Low Cervical Transverse Procedure(s):   SECTION    Surgeon(s):  Kodi Ma MD    Anesthesia: Epidural    Prophylactic Antibiotics: clindamycin and gentamicin pre-op    Procedure Details:    Patient was induced under regional anesthetic, placed supine, bump under right hip, scrubbed and draped. She was checked for adequate anesthesia. Pfannenstiel incision was made in the skin, extended through the subcutaneous tissue and the anterior rectus sheath. The rectus muscles were  and a longitudinal incision was made in the parietal peritoneum preserving the bladder. John retractor was positioned. Transverse incision is made in the lower segment of the uterus after first carefully locating the position of the lower segment. Baby is delivered through this incision following assistance from RN providing disengagement from below. No traction is placed upon the fetal head during this delivery. After a 30 seconds delay to allow equilibration of blood volume between baby and placenta, cord is clamped and cut. Baby is handed off to waiting pediatric expert, after initial resuscitation with bulb suction while we waited for the cord to be clamped and cut. Placenta was now delivered using gentle cord traction. Uterine cavity was carefully cleaned with a dry pack and inspected to be sure that it was empty. Myometrium was closed in two layers, the first running locking and the second running imbricating of 0 monocrul.   A interrupted stitch of 0 vicryl was placed at the left apex for hemostasis. Once we were sure that hemostasis had been achieved. Bilateral paratubal cysts noted with a white opaque 1-2cm cyst/mass on right mesosalpinx. This was dissected out of the mesosalpinx and what appeared to be a vascular stalk was suture ligated and the specimen was sent for permanent evaluation by pathology. Blood and clot was cleared from peritoneum. Sponge and instrument count was now obtained and we closed the parietal peritoneum with a running suture of 2-0 plain. Careful hemostasis was achieved in the rectus sheath, and this was closed with a running suture of 0 vicryl. Careful hemostasis was obtained with cautery in the subcutaneous tissue, subcutaneous suture placed with 2-0 plain, and the skin was closed with 4-0 monocryl. Sterile dressing was applied, patient was recovered and sent to recovery room in good condition. Sponge and needle correct postop as per nursing staff. Estimated Blood Loss: 800  Replacement: no blood products    Fetal Description: gramajo male    Apgar - One Minute: 8    Apgar - Five Minutes: 9    Umbilical Cord: Nuchal Cord x  1 and 3 vessels present             Cord Blood Results:   Information for the patient's :  Jennifer Whitten [[id number]]   No results found for: PCTABR, PCTDIG, BILI, ABORH          Birth Information:   Information for the patient's :  Jennifer Zacariasy [[id number]]          Specimens: None, unless otherwise specified here.  * No specimens in log *       Maternal Findings    Uterus Size: normal, Fibroids: no , Adhesions: {None,    Tubes abnormal   Ovaries normal   Abdomen Adhesions: None   Subcutaneous thickness 2 cm            Complications:  none    Birth Weight: 3620    Mother's Condition: good and stable  Baby's Condition: good and stable    Signed: Marcel Fontaine MD      2020

## 2020-10-25 VITALS
OXYGEN SATURATION: 100 % | BODY MASS INDEX: 43.32 KG/M2 | HEART RATE: 89 BPM | WEIGHT: 260 LBS | TEMPERATURE: 98.1 F | SYSTOLIC BLOOD PRESSURE: 126 MMHG | DIASTOLIC BLOOD PRESSURE: 68 MMHG | RESPIRATION RATE: 16 BRPM | HEIGHT: 65 IN

## 2020-10-25 LAB
GLUCOSE BLD STRIP.AUTO-MCNC: 101 MG/DL (ref 70–110)
GLUCOSE BLD STRIP.AUTO-MCNC: 87 MG/DL (ref 70–110)

## 2020-10-25 PROCEDURE — 74011250636 HC RX REV CODE- 250/636: Performed by: STUDENT IN AN ORGANIZED HEALTH CARE EDUCATION/TRAINING PROGRAM

## 2020-10-25 PROCEDURE — 82962 GLUCOSE BLOOD TEST: CPT

## 2020-10-25 PROCEDURE — 90707 MMR VACCINE SC: CPT | Performed by: STUDENT IN AN ORGANIZED HEALTH CARE EDUCATION/TRAINING PROGRAM

## 2020-10-25 PROCEDURE — 74011250637 HC RX REV CODE- 250/637: Performed by: STUDENT IN AN ORGANIZED HEALTH CARE EDUCATION/TRAINING PROGRAM

## 2020-10-25 RX ORDER — OXYCODONE AND ACETAMINOPHEN 5; 325 MG/1; MG/1
1-2 TABLET ORAL
Qty: 18 TAB | Refills: 0 | Status: SHIPPED | OUTPATIENT
Start: 2020-10-25 | End: 2020-10-28

## 2020-10-25 RX ORDER — DOCUSATE SODIUM 100 MG/1
100 CAPSULE, LIQUID FILLED ORAL 2 TIMES DAILY
Qty: 60 CAP | Refills: 2 | Status: SHIPPED | OUTPATIENT
Start: 2020-10-25 | End: 2021-01-23

## 2020-10-25 RX ORDER — IBUPROFEN 800 MG/1
800 TABLET ORAL
Qty: 30 TAB | Refills: 0 | Status: SHIPPED | OUTPATIENT
Start: 2020-10-26

## 2020-10-25 RX ADMIN — OXYCODONE HYDROCHLORIDE AND ACETAMINOPHEN 1 TABLET: 5; 325 TABLET ORAL at 00:58

## 2020-10-25 RX ADMIN — OXYCODONE HYDROCHLORIDE AND ACETAMINOPHEN 2 TABLET: 5; 325 TABLET ORAL at 16:51

## 2020-10-25 RX ADMIN — OXYCODONE HYDROCHLORIDE AND ACETAMINOPHEN 2 TABLET: 5; 325 TABLET ORAL at 07:53

## 2020-10-25 RX ADMIN — Medication 10 ML: at 15:45

## 2020-10-25 RX ADMIN — KETOROLAC TROMETHAMINE 30 MG: 30 INJECTION, SOLUTION INTRAMUSCULAR at 03:48

## 2020-10-25 RX ADMIN — Medication 10 ML: at 10:40

## 2020-10-25 RX ADMIN — KETOROLAC TROMETHAMINE 30 MG: 30 INJECTION, SOLUTION INTRAMUSCULAR at 10:39

## 2020-10-25 RX ADMIN — KETOROLAC TROMETHAMINE 30 MG: 30 INJECTION, SOLUTION INTRAMUSCULAR at 15:45

## 2020-10-25 RX ADMIN — MEASLES, MUMPS, AND RUBELLA VIRUS VACCINE LIVE 0.5 ML: 1000; 12500; 1000 INJECTION, POWDER, LYOPHILIZED, FOR SUSPENSION SUBCUTANEOUS at 16:38

## 2020-10-25 NOTE — PROGRESS NOTES
0720 Bedside and verbal shift change report given to Thuy Shah RN by Wiliam Barnett RN. Report given with use of SBAR, Kardex, MAR, I/O, Recent Results. Assumed care of pt at this time. 0750 VSS   Temp Pulse Resp BP SpO2   10/25/20 0750 98.2 °F (36.8 °C) 75 18 125/73 100 %       0753 Assessment complete at this time. Fundus firm at U-1, scant lochia rubra, no clots present. Dressing CDI. Pt encouraged to shower today and remove dressing. Pain 8/10, Percocet x2 administered at this time. Incentive spirometer use independent. Pt denies headache, visual disturbances, ringing in the ears, SOB, chest pain, and/or shooting pain in calves. Pt educated on previously stated signs and symptoms to report, plan of care for the night, proper leidy care, pain management; infant feeding, safety, and I/O log sheet- pt verbalized understanding. Opportunity for questions offered, pt denies questions at this time. Pt in bed to rest at this time, 2L O2 via nasal cannula. Pt denies needs. Bed in lowest position, call bell in reach. 1407 BS obtained at this time resulting 101. Pt in bed bonding with infant at this time. Pt denies needs. Bed in lowest position. Call bell in reach. 1520 VSS   Temp Pulse Resp BP SpO2   10/25/20 1520 98.1 °F (36.7 °C) 89 16 126/68 100 %       1545 Scheduled Toradol administered at this time. IV removed, catheter tip intact. Fundus firm U-1, scant lochia rubra, no clots present. Dressing CDI. Pt to shower at this time. Pt denies needs. Bed in lowest position. Call bell in reach. 1638 MMR administered at this time. 1651 Percocet x 2 administered at this time. 1655 Discharge education provided at this time for pt and infant. Opportunity for questions offered, pt denies questions at this time. Esign complete. Bands verified. Pt discharged to room at this time. Infant to be discharged tonight at 2000. Infant swaddled supine in bassinet at mothers bedside.

## 2020-10-25 NOTE — PERIOP NOTES
Anesthesia: Post-op Duramorph Rounds      Pt reported good post-op analgesia from  Duramorph. No complaints of back pain or headache from neuraxial block  No complaints of residual motor or sensory deficits. No apparent anesthetic complications. She has mild itching but has not yet taken benadryl.

## 2020-10-25 NOTE — PROGRESS NOTES
Problem: Pain  Goal: *Control of Pain  Outcome: Progressing Towards Goal     Problem: Patient Education: Go to Patient Education Activity  Goal: Patient/Family Education  Outcome: Progressing Towards Goal     Problem: Falls - Risk of  Goal: *Absence of Falls  Description: Document Hailey Sena Fall Risk and appropriate interventions in the flowsheet.   Outcome: Progressing Towards Goal  Note: Fall Risk Interventions:  Mobility Interventions: Patient to call before getting OOB         Medication Interventions: Patient to call before getting OOB    Elimination Interventions: Call light in reach, Patient to call for help with toileting needs              Problem: Patient Education: Go to Patient Education Activity  Goal: Patient/Family Education  Outcome: Progressing Towards Goal     Problem:  Delivery: Postpartum Day 1  Goal: Activity/Safety  Outcome: Progressing Towards Goal  Goal: Diagnostic Test/Procedures  Outcome: Progressing Towards Goal  Goal: Nutrition/Diet  Outcome: Progressing Towards Goal  Goal: Discharge Planning  Outcome: Progressing Towards Goal  Goal: Medications  Outcome: Progressing Towards Goal  Goal: Respiratory  Outcome: Progressing Towards Goal  Goal: Treatments/Interventions/Procedures  Outcome: Progressing Towards Goal  Goal: Psychosocial  Outcome: Progressing Towards Goal  Goal: *Vital signs within defined limits  Outcome: Progressing Towards Goal  Goal: *Labs within defined limits  Outcome: Progressing Towards Goal  Goal: *Hemodynamically stable  Outcome: Progressing Towards Goal  Goal: *Optimal pain control at patient's stated goal  Outcome: Progressing Towards Goal  Goal: *Participates in infant care  Outcome: Progressing Towards Goal  Goal: *Demonstrates progressive activity  Outcome: Progressing Towards Goal  Goal: *Tolerating diet  Outcome: Progressing Towards Goal  Goal: *Performs self perineal care  Outcome: Progressing Towards Goal

## 2020-10-25 NOTE — DISCHARGE SUMMARY
Obstetrical Discharge Summary     Name: Emily Loving MRN: [id number]  SSN: [id number]    YOB: 1990  Age: 31 y.o. Sex: female      Admit Date: 10/23/2020    Discharge Date: 10/25/2020    Admitting Physician: Caleb Coreas MD     Attending Physician:  Lopez Bustos MD     Discharge Diagnoses:   Information for the patient's :  Cheri Flores [[id number]]   Delivery of a 3.62 kg male infant via , Low Transverse on 10/23/2020 at 9:10 PM  by Kodi Ma. Apgars were 8  and 9 . Additional Diagnoses:   Problem List as of 10/25/2020 Date Reviewed: 10/23/2020          Codes Class Noted - Resolved    37 weeks gestation of pregnancy ICD-10-CM: Z3A.37  ICD-9-CM: V22.2  10/23/2020 - Present        * (Principal) Premature rupture of membranes (PROM) affecting first pregnancy ICD-10-CM: O42.90  ICD-9-CM: 658.10  10/23/2020 - Present        Obesity ICD-10-CM: E66.9  ICD-9-CM: 278.00  10/23/2020 - Present        Labor and delivery, indication for care ICD-10-CM: O75.9  ICD-9-CM: 659.90  10/23/2020 - Present        GDM (gestational diabetes mellitus) ICD-10-CM: O24.419  ICD-9-CM: 648.80  10/20/2020 - Present        36 weeks gestation of pregnancy ICD-10-CM: Z3A.36  ICD-9-CM: V22.2  10/20/2020 - Present        Decreased fetal movement ICD-10-CM: O36.8190  ICD-9-CM: 655.70  2020 - Present        IUP (intrauterine pregnancy), incidental ICD-10-CM: Z33.1  ICD-9-CM: V22.2  2020 - Present        26 weeks gestation of pregnancy ICD-10-CM: Z3A.26  ICD-9-CM: V22.2  2020 - Present              Lab Results   Component Value Date/Time    Rubella, External not immune 2020     Recent Labs     10/24/20  0240   HGB 10.5*       Hospital Course: Normal postpartum course however she did have an episode of desaturation determined to be ZIYAD.  Not officially dx however encouraged outpatient work up     Patient Instructions:   Current Discharge Medication List      START taking these medications    Details   ibuprofen (MOTRIN) 800 mg tablet Take 1 Tab by mouth every eight (8) hours as needed for Pain. Qty: 30 Tab, Refills: 0      oxyCODONE-acetaminophen (PERCOCET) 5-325 mg per tablet Take 1-2 Tabs by mouth every four (4) hours as needed for Pain for up to 3 days. Max Daily Amount: 12 Tabs. Qty: 18 Tab, Refills: 0    Associated Diagnoses: Insulin controlled gestational diabetes mellitus (GDM) in third trimester      docusate sodium (Colace) 100 mg capsule Take 1 Cap by mouth two (2) times a day for 90 days. Qty: 60 Cap, Refills: 2      !! insulin NPH (HumuLIN N NPH U-100 Insulin) 100 unit/mL injection 20 Units by SubCUTAneous route daily. Take in morning  Qty: 1 Vial, Refills: 3      !! insulin NPH (HumuLIN N NPH U-100 Insulin) 100 unit/mL injection 10 Units by SubCUTAneous route daily. Before bed  Qty: 1 Vial, Refills: 3       !! - Potential duplicate medications found. Please discuss with provider. CONTINUE these medications which have NOT CHANGED    Details   prenatal 19/OKWE fum/folic/dha (PRENATAL-1 PO) Take 1 Tab by mouth daily. STOP taking these medications       insulin regular (NovoLIN R Regular U-100 Insuln) 100 unit/mL injection Comments:   Reason for Stopping:               Reference my discharge instructions. No orders of the defined types were placed in this encounter.        Signed By:  Hardeep Quiñones MD     October 25, 2020

## 2020-10-25 NOTE — ANESTHESIA POSTPROCEDURE EVALUATION
Post-Anesthesia Evaluation and Assessment    Cardiovascular Function/Vital Signs      Patient is status post Procedure(s):   SECTION. /61  HR 83  O2 Sat 97    Nausea/Vomiting: Controlled. Postoperative hydration reviewed and adequate. Pain:  VAS 5/10   Managed. Neurological Status:   Within defined limits. Epidural block receding. Mental Status and Level of Consciousness: Awake and alert. Pulmonary Status:   Room air. Adequate oxygenation and airway patent. Complications related to anesthesia: None    Post-anesthesia assessment completed. No concerns. Patient has met all discharge requirements.     Signed By: Tony Flores MD    2020

## 2022-03-19 PROBLEM — Z3A.36 36 WEEKS GESTATION OF PREGNANCY: Status: ACTIVE | Noted: 2020-10-20

## 2022-03-19 PROBLEM — Z3A.37 37 WEEKS GESTATION OF PREGNANCY: Status: ACTIVE | Noted: 2020-10-23

## 2022-03-19 PROBLEM — O24.419 GDM (GESTATIONAL DIABETES MELLITUS): Status: ACTIVE | Noted: 2020-10-20

## 2022-03-19 PROBLEM — E66.9 OBESITY: Status: ACTIVE | Noted: 2020-10-23

## 2022-03-19 PROBLEM — Z33.1 IUP (INTRAUTERINE PREGNANCY), INCIDENTAL: Status: ACTIVE | Noted: 2020-08-08

## 2022-03-19 PROBLEM — O36.8190 DECREASED FETAL MOVEMENT: Status: ACTIVE | Noted: 2020-08-08

## 2022-03-20 PROBLEM — O42.90 PREMATURE RUPTURE OF MEMBRANES (PROM) AFFECTING FIRST PREGNANCY: Status: ACTIVE | Noted: 2020-10-23

## 2022-03-20 PROBLEM — Z3A.26 26 WEEKS GESTATION OF PREGNANCY: Status: ACTIVE | Noted: 2020-08-08

## 2025-03-12 ENCOUNTER — HOSPITAL ENCOUNTER (EMERGENCY)
Facility: HOSPITAL | Age: 35
Discharge: HOME | End: 2025-03-13
Payer: MEDICAID

## 2025-03-12 VITALS
WEIGHT: 194 LBS | RESPIRATION RATE: 18 BRPM | OXYGEN SATURATION: 100 % | TEMPERATURE: 98.4 F | BODY MASS INDEX: 33.12 KG/M2 | DIASTOLIC BLOOD PRESSURE: 89 MMHG | SYSTOLIC BLOOD PRESSURE: 121 MMHG | HEART RATE: 108 BPM | HEIGHT: 64 IN

## 2025-03-12 DIAGNOSIS — R11.2 DRUG-INDUCED NAUSEA AND VOMITING: Primary | ICD-10-CM

## 2025-03-12 DIAGNOSIS — T50.905A DRUG-INDUCED NAUSEA AND VOMITING: Primary | ICD-10-CM

## 2025-03-12 LAB
ALBUMIN SERPL-MCNC: 3.5 G/DL (ref 3.4–5)
ALBUMIN/GLOB SERPL: 1 (ref 0.8–1.7)
ALP SERPL-CCNC: 66 U/L (ref 45–117)
ALT SERPL-CCNC: 38 U/L (ref 13–56)
ANION GAP SERPL CALC-SCNC: 7 MMOL/L (ref 3–18)
AST SERPL-CCNC: 20 U/L (ref 10–38)
BASOPHILS # BLD: 0.01 K/UL (ref 0–0.1)
BASOPHILS NFR BLD: 0.3 % (ref 0–2)
BILIRUB SERPL-MCNC: 0.3 MG/DL (ref 0.2–1)
BUN SERPL-MCNC: 8 MG/DL (ref 7–18)
BUN/CREAT SERPL: 11 (ref 12–20)
CALCIUM SERPL-MCNC: 8.7 MG/DL (ref 8.5–10.1)
CHLORIDE SERPL-SCNC: 100 MMOL/L (ref 100–111)
CO2 SERPL-SCNC: 28 MMOL/L (ref 21–32)
CREAT SERPL-MCNC: 0.74 MG/DL (ref 0.6–1.3)
DIFFERENTIAL METHOD BLD: ABNORMAL
EOSINOPHIL # BLD: 0.09 K/UL (ref 0–0.4)
EOSINOPHIL NFR BLD: 2.6 % (ref 0–5)
ERYTHROCYTE [DISTWIDTH] IN BLOOD BY AUTOMATED COUNT: 12.5 % (ref 11.6–14.5)
GLOBULIN SER CALC-MCNC: 3.6 G/DL (ref 2–4)
GLUCOSE SERPL-MCNC: 192 MG/DL (ref 74–99)
HCT VFR BLD AUTO: 40 % (ref 35–45)
HGB BLD-MCNC: 13.6 G/DL (ref 12–16)
IMM GRANULOCYTES # BLD AUTO: 0 K/UL (ref 0–0.04)
IMM GRANULOCYTES NFR BLD AUTO: 0 % (ref 0–0.5)
LIPASE SERPL-CCNC: 21 U/L (ref 13–75)
LYMPHOCYTES # BLD: 0.53 K/UL (ref 0.9–3.3)
LYMPHOCYTES NFR BLD: 15.4 % (ref 21–52)
MCH RBC QN AUTO: 27.6 PG (ref 24–34)
MCHC RBC AUTO-ENTMCNC: 34 G/DL (ref 31–37)
MCV RBC AUTO: 81.3 FL (ref 78–100)
MONOCYTES # BLD: 0.5 K/UL (ref 0.05–1.2)
MONOCYTES NFR BLD: 14.5 % (ref 3–10)
NEUTS SEG # BLD: 2.31 K/UL (ref 1.8–8)
NEUTS SEG NFR BLD: 67.2 % (ref 40–73)
NRBC # BLD: 0 K/UL (ref 0–0.01)
NRBC BLD-RTO: 0 PER 100 WBC
PLATELET # BLD AUTO: 222 K/UL (ref 135–420)
PMV BLD AUTO: 10 FL (ref 9.2–11.8)
POTASSIUM SERPL-SCNC: 3.9 MMOL/L (ref 3.5–5.5)
PROT SERPL-MCNC: 7.1 G/DL (ref 6.4–8.2)
RBC # BLD AUTO: 4.92 M/UL (ref 4.2–5.3)
SODIUM SERPL-SCNC: 135 MMOL/L (ref 136–145)
WBC # BLD AUTO: 3.4 K/UL (ref 4.6–13.2)

## 2025-03-12 PROCEDURE — 6360000002 HC RX W HCPCS

## 2025-03-12 PROCEDURE — 96375 TX/PRO/DX INJ NEW DRUG ADDON: CPT

## 2025-03-12 PROCEDURE — 83690 ASSAY OF LIPASE: CPT

## 2025-03-12 PROCEDURE — 80053 COMPREHEN METABOLIC PANEL: CPT

## 2025-03-12 PROCEDURE — 96365 THER/PROPH/DIAG IV INF INIT: CPT

## 2025-03-12 PROCEDURE — 85025 COMPLETE CBC W/AUTO DIFF WBC: CPT

## 2025-03-12 PROCEDURE — 2580000003 HC RX 258

## 2025-03-12 PROCEDURE — 99284 EMERGENCY DEPT VISIT MOD MDM: CPT

## 2025-03-12 RX ORDER — 0.9 % SODIUM CHLORIDE 0.9 %
1000 INTRAVENOUS SOLUTION INTRAVENOUS ONCE
Status: COMPLETED | OUTPATIENT
Start: 2025-03-12 | End: 2025-03-12

## 2025-03-12 RX ORDER — ONDANSETRON 2 MG/ML
4 INJECTION INTRAMUSCULAR; INTRAVENOUS ONCE
Status: COMPLETED | OUTPATIENT
Start: 2025-03-12 | End: 2025-03-12

## 2025-03-12 RX ADMIN — ONDANSETRON 4 MG: 2 INJECTION, SOLUTION INTRAMUSCULAR; INTRAVENOUS at 22:29

## 2025-03-12 RX ADMIN — Medication 25 MG: at 23:15

## 2025-03-12 RX ADMIN — SODIUM CHLORIDE 1000 ML: 0.9 INJECTION, SOLUTION INTRAVENOUS at 23:10

## 2025-03-13 RX ORDER — ONDANSETRON 4 MG/1
4 TABLET, FILM COATED ORAL 3 TIMES DAILY PRN
Qty: 30 TABLET | Refills: 0 | Status: SHIPPED | OUTPATIENT
Start: 2025-03-13

## 2025-03-13 RX ORDER — PROMETHAZINE HYDROCHLORIDE 12.5 MG/1
25 TABLET ORAL 4 TIMES DAILY PRN
Qty: 20 TABLET | Refills: 0 | Status: SHIPPED | OUTPATIENT
Start: 2025-03-13 | End: 2025-03-20

## 2025-03-13 ASSESSMENT — ENCOUNTER SYMPTOMS
ABDOMINAL PAIN: 0
NAUSEA: 1
DIARRHEA: 1
VOMITING: 1

## 2025-03-13 NOTE — DISCHARGE INSTRUCTIONS
Please discontinue taking your Trulicity until speaking with your PCP.  Is likely that is giving you severe nausea and vomiting.  Please take the Zofran and Phenergan as prescribed for any nausea or vomiting.  Please try to stay hydrated.  Please return to the emergency room if your symptoms worsen.

## 2025-03-13 NOTE — ED PROVIDER NOTES
[facility]  EMERGENCY DEPARTMENT ENCOUNTER         Pt Name: Mare Sahu  MRN: [id number]  Birthdate 1990  Date of evaluation: 3/12/2025  Provider: Martha Paul PA-C   PCP: No primary care provider on file.  Note Started: 12:05 AM EDT 3/13/25     CHIEF COMPLAINT       Chief Complaint   Patient presents with    Chills    Nausea        HISTORY OF PRESENT ILLNESS: 1 or more elements      History From: Patient  HPI Limitations: None  Arrival Mode:     Mare Sahu is a 34 y.o. female who presents with complaints of nausea, vomiting, dizziness, fever chills and fatigue x 4 days.  Patient states that she was seen at [facility] on Saturday, given nausea meds and fluids but has since experienced breakthrough symptoms.  Patient just started taking Trulicity for her type 2 diabetes on Friday.  She states that as soon as she started taking the medication she experienced sometimes of intractable nausea and vomiting.  NIH of 0.  Patient states that she called her PCP who told her that these are normal side effects of Trulicity and it is just her body adjusting to the symptoms.  Patient states her symptoms have been so severe that she needed to leave work.  She was given Zofran after discharge from the emergency room but states it has not really been working too well for her symptoms.  She denies any abdominal pain, hematemesis or blood per rectum.     Nursing Notes were all reviewed and agreed with or any disagreements were addressed in the HPI.  Please see MDM for additional details of HPI and ROS     REVIEW OF SYSTEMS      Review of Systems   Constitutional:  Positive for fatigue. Negative for fever.   Gastrointestinal:  Positive for diarrhea, nausea and vomiting. Negative for abdominal pain.   Neurological:  Positive for weakness.   All other systems reviewed and are negative.       Positives and Pertinent negatives as per HPI.    PAST HISTORY     Past Medical History:  Past Medical History:

## 2025-03-13 NOTE — ED TRIAGE NOTES
Pt ambulatory to ed with complaints of nausea, vomiting, dizziness, fever, chills, fatigue x4 days. Pt states she was seen at [facility] Saturday and given nausea meds but states it is not helping.  States she started taking trulicity for type 2 diabetes on last Friday.

## 2025-03-15 ENCOUNTER — HOSPITAL ENCOUNTER (EMERGENCY)
Facility: HOSPITAL | Age: 35
Discharge: HOME | End: 2025-03-15
Payer: MEDICAID

## 2025-03-15 VITALS
WEIGHT: 194 LBS | TEMPERATURE: 97.3 F | HEIGHT: 64 IN | BODY MASS INDEX: 33.12 KG/M2 | DIASTOLIC BLOOD PRESSURE: 115 MMHG | SYSTOLIC BLOOD PRESSURE: 135 MMHG | RESPIRATION RATE: 16 BRPM | HEART RATE: 98 BPM | OXYGEN SATURATION: 100 %

## 2025-03-15 DIAGNOSIS — N30.00 ACUTE CYSTITIS WITHOUT HEMATURIA: ICD-10-CM

## 2025-03-15 DIAGNOSIS — E11.9 TYPE 2 DIABETES MELLITUS WITHOUT COMPLICATION, WITHOUT LONG-TERM CURRENT USE OF INSULIN: ICD-10-CM

## 2025-03-15 DIAGNOSIS — R11.2 NAUSEA AND VOMITING, UNSPECIFIED VOMITING TYPE: Primary | ICD-10-CM

## 2025-03-15 DIAGNOSIS — T50.905A MEDICATION ADVERSE EFFECT, INITIAL ENCOUNTER: ICD-10-CM

## 2025-03-15 LAB
ALBUMIN SERPL-MCNC: 3.4 G/DL (ref 3.4–5)
ALBUMIN/GLOB SERPL: 0.9 (ref 0.8–1.7)
ALP SERPL-CCNC: 63 U/L (ref 45–117)
ALT SERPL-CCNC: 35 U/L (ref 13–56)
ANION GAP SERPL CALC-SCNC: 7 MMOL/L (ref 3–18)
APPEARANCE UR: CLEAR
AST SERPL-CCNC: 14 U/L (ref 10–38)
BACTERIA URNS QL MICRO: ABNORMAL /HPF
BASOPHILS # BLD: 0 K/UL (ref 0–0.1)
BASOPHILS NFR BLD: 0 % (ref 0–2)
BILIRUB SERPL-MCNC: 0.4 MG/DL (ref 0.2–1)
BILIRUB UR QL: NEGATIVE
BUN SERPL-MCNC: 7 MG/DL (ref 7–18)
BUN/CREAT SERPL: 11 (ref 12–20)
CALCIUM SERPL-MCNC: 8.6 MG/DL (ref 8.5–10.1)
CHLORIDE SERPL-SCNC: 103 MMOL/L (ref 100–111)
CO2 SERPL-SCNC: 28 MMOL/L (ref 21–32)
COLOR UR: YELLOW
CREAT SERPL-MCNC: 0.64 MG/DL (ref 0.6–1.3)
DIFFERENTIAL METHOD BLD: ABNORMAL
EOSINOPHIL # BLD: 0.09 K/UL (ref 0–0.4)
EOSINOPHIL NFR BLD: 2 % (ref 0–5)
EPITH CASTS URNS QL MICRO: ABNORMAL /LPF (ref 0–5)
ERYTHROCYTE [DISTWIDTH] IN BLOOD BY AUTOMATED COUNT: 12.4 % (ref 11.6–14.5)
GLOBULIN SER CALC-MCNC: 3.6 G/DL (ref 2–4)
GLUCOSE SERPL-MCNC: 177 MG/DL (ref 74–99)
GLUCOSE UR STRIP.AUTO-MCNC: 250 MG/DL
HCG UR QL: NEGATIVE
HCT VFR BLD AUTO: 41.2 % (ref 35–45)
HGB BLD-MCNC: 14.1 G/DL (ref 12–16)
HGB UR QL STRIP: NEGATIVE
IMM GRANULOCYTES # BLD AUTO: 0 K/UL
IMM GRANULOCYTES NFR BLD AUTO: 0 %
KETONES UR QL STRIP.AUTO: 80 MG/DL
LEUKOCYTE ESTERASE UR QL STRIP.AUTO: ABNORMAL
LIPASE SERPL-CCNC: 17 U/L (ref 13–75)
LYMPHOCYTES # BLD: 0.9 K/UL (ref 0.9–3.6)
LYMPHOCYTES NFR BLD: 20 % (ref 21–52)
MCH RBC QN AUTO: 27.7 PG (ref 24–34)
MCHC RBC AUTO-ENTMCNC: 34.2 G/DL (ref 31–37)
MCV RBC AUTO: 80.9 FL (ref 78–100)
MONOCYTES # BLD: 0.27 K/UL (ref 0.05–1.2)
MONOCYTES NFR BLD: 6 % (ref 3–10)
MUCOUS THREADS URNS QL MICRO: ABNORMAL /LPF
NEUTS SEG # BLD: 3.24 K/UL (ref 1.8–8)
NEUTS SEG NFR BLD: 72 % (ref 40–73)
NITRITE UR QL STRIP.AUTO: NEGATIVE
NRBC # BLD: 0 K/UL (ref 0–0.01)
NRBC BLD-RTO: 0 PER 100 WBC
PH UR STRIP: 5.5 (ref 5–8)
PLATELET # BLD AUTO: 218 K/UL (ref 135–420)
PLATELET COMMENT: ABNORMAL
PMV BLD AUTO: 10.4 FL (ref 9.2–11.8)
POTASSIUM SERPL-SCNC: 3.8 MMOL/L (ref 3.5–5.5)
PROT SERPL-MCNC: 7 G/DL (ref 6.4–8.2)
PROT UR STRIP-MCNC: ABNORMAL MG/DL
RBC # BLD AUTO: 5.09 M/UL (ref 4.2–5.3)
RBC #/AREA URNS HPF: ABNORMAL /HPF (ref 0–5)
RBC MORPH BLD: ABNORMAL
SODIUM SERPL-SCNC: 138 MMOL/L (ref 136–145)
SP GR UR REFRACTOMETRY: 1.01 (ref 1–1.03)
UROBILINOGEN UR QL STRIP.AUTO: 1 EU/DL (ref 0.2–1)
WBC # BLD AUTO: 4.5 K/UL (ref 4.6–13.2)
WBC URNS QL MICRO: ABNORMAL /HPF (ref 0–5)

## 2025-03-15 PROCEDURE — 81025 URINE PREGNANCY TEST: CPT

## 2025-03-15 PROCEDURE — 83690 ASSAY OF LIPASE: CPT

## 2025-03-15 PROCEDURE — 87086 URINE CULTURE/COLONY COUNT: CPT

## 2025-03-15 PROCEDURE — 96374 THER/PROPH/DIAG INJ IV PUSH: CPT

## 2025-03-15 PROCEDURE — 2580000003 HC RX 258: Performed by: PHYSICIAN ASSISTANT

## 2025-03-15 PROCEDURE — 80053 COMPREHEN METABOLIC PANEL: CPT

## 2025-03-15 PROCEDURE — 85025 COMPLETE CBC W/AUTO DIFF WBC: CPT

## 2025-03-15 PROCEDURE — 99284 EMERGENCY DEPT VISIT MOD MDM: CPT

## 2025-03-15 PROCEDURE — 81001 URINALYSIS AUTO W/SCOPE: CPT

## 2025-03-15 PROCEDURE — 6360000002 HC RX W HCPCS: Performed by: PHYSICIAN ASSISTANT

## 2025-03-15 RX ORDER — ONDANSETRON 2 MG/ML
4 INJECTION INTRAMUSCULAR; INTRAVENOUS
Status: COMPLETED | OUTPATIENT
Start: 2025-03-15 | End: 2025-03-15

## 2025-03-15 RX ORDER — METOCLOPRAMIDE 10 MG/1
10 TABLET ORAL
Qty: 12 TABLET | Refills: 0 | Status: SHIPPED | OUTPATIENT
Start: 2025-03-15

## 2025-03-15 RX ORDER — NITROFURANTOIN 25; 75 MG/1; MG/1
100 CAPSULE ORAL 2 TIMES DAILY
Qty: 14 CAPSULE | Refills: 0 | Status: SHIPPED | OUTPATIENT
Start: 2025-03-15 | End: 2025-03-22

## 2025-03-15 RX ORDER — 0.9 % SODIUM CHLORIDE 0.9 %
1000 INTRAVENOUS SOLUTION INTRAVENOUS ONCE
Status: COMPLETED | OUTPATIENT
Start: 2025-03-15 | End: 2025-03-15

## 2025-03-15 RX ADMIN — SODIUM CHLORIDE 1000 ML: 900 INJECTION, SOLUTION INTRAVENOUS at 18:03

## 2025-03-15 RX ADMIN — ONDANSETRON 4 MG: 2 INJECTION, SOLUTION INTRAMUSCULAR; INTRAVENOUS at 14:54

## 2025-03-15 RX ADMIN — SODIUM CHLORIDE 1000 ML: 900 INJECTION, SOLUTION INTRAVENOUS at 14:54

## 2025-03-15 ASSESSMENT — PAIN DESCRIPTION - FREQUENCY: FREQUENCY: INTERMITTENT

## 2025-03-15 ASSESSMENT — PAIN DESCRIPTION - PAIN TYPE: TYPE: CHRONIC PAIN;ACUTE PAIN

## 2025-03-15 ASSESSMENT — PAIN DESCRIPTION - DESCRIPTORS: DESCRIPTORS: ACHING

## 2025-03-15 ASSESSMENT — PAIN - FUNCTIONAL ASSESSMENT: PAIN_FUNCTIONAL_ASSESSMENT: 0-10

## 2025-03-15 ASSESSMENT — PAIN SCALES - GENERAL: PAINLEVEL_OUTOF10: 7

## 2025-03-15 ASSESSMENT — PAIN DESCRIPTION - LOCATION: LOCATION: FLANK

## 2025-03-15 ASSESSMENT — PAIN DESCRIPTION - ORIENTATION: ORIENTATION: RIGHT;LEFT

## 2025-03-15 NOTE — ED PROVIDER NOTES
[facility]  EMERGENCY DEPARTMENT ENCOUNTER       Pt Name: Mare Sahu  MRN: [id number]  Birthdate 1990  Date of evaluation: 3/15/2025  PCP: No primary care provider on file.  Note Started: 6:01 PM 3/15/25     CHIEF COMPLAINT       No chief complaint on file.       HISTORY OF PRESENT ILLNESS: 1 or more elements      History From: Patient  HPI Limitations: None  Chronic Conditions: DM, anxiety  Social Determinants affecting Dx or Tx: none      Mare Sahu is a 34 y.o. female who presents to ED c/o nausea and vomiting.  Patient reports history of gestational diabetes diagnosed in 2020 and was on metformin for a few months afterwards but has been off any kind of diabetes medications and lost to follow-up until recently she was just diagnosed with type 2 diabetes with A1c of 13.  She was given her first Trulicity 0.75mg injection 8 days ago, started having severe nausea and vomiting the following day.  She went to [facility] 6 days ago was given Zofran, then started glipizide 10 mg twice daily 5 days ago but continues to have nausea and intermittent vomiting and low back pain.  She denies abdominal pain, fever, diarrhea, dysuria and any other symptoms or complaints.  Patient does not smoke cigarettes, drinking any alcohol and denies any other drugs.  She specifically denies THC/CBD use.      Nursing Notes were all reviewed and agreed with or any disagreements were addressed in the HPI.    PAST HISTORY     Past Medical History:  Past Medical History:   Diagnosis Date    Anxiety     Asthma     DJD (degenerative joint disease)     Gestational diabetes     Heart abnormality     heart murmur    Heart murmur     Infertility, female     Panic attacks     Psychiatric problem     manic depression       Past Surgical History:  History reviewed. No pertinent surgical history.    Family History:  History reviewed. No pertinent family history.    Social History:  Social History     Socioeconomic  as: ZOFRAN  Take 1 tablet by mouth 3 times daily as needed for Nausea or Vomiting     promethazine 12.5 MG tablet  Commonly known as: PHENERGAN  Take 2 tablets by mouth 4 times daily as needed for Nausea               Where to Get Your Medications        These medications were sent to [id number] [employer] - [place], [place] - [address] - P [phone] - F [phone] [address] [facility] [id number]      Phone: [phone]   metoclopramide 10 MG tablet  nitrofurantoin (macrocrystal-monohydrate) 100 MG capsule              I am the Primary Clinician of Record.       (Please note that parts of this dictation were completed with voice recognition software. Quite often unanticipated grammatical, syntax, homophones, and other interpretive errors are inadvertently transcribed by the computer software. Please disregards these errors. Please excuse any errors that have escaped final proofreading.)      Carey Mccain PA  03/15/25 7230

## 2025-03-15 NOTE — ED TRIAGE NOTES
Patient reports that she is having kidney pain on both sides. She was seen here on Thursday but she has not give the medications they gave her.

## 2025-03-17 LAB
BACTERIA SPEC CULT: NORMAL
SERVICE CMNT-IMP: NORMAL